# Patient Record
Sex: MALE | Race: WHITE | NOT HISPANIC OR LATINO | Employment: FULL TIME | URBAN - METROPOLITAN AREA
[De-identification: names, ages, dates, MRNs, and addresses within clinical notes are randomized per-mention and may not be internally consistent; named-entity substitution may affect disease eponyms.]

---
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
## 2022-08-01 ENCOUNTER — LAB (OUTPATIENT)
Dept: LAB | Facility: HOSPITAL | Age: 22
End: 2022-08-01

## 2022-08-01 ENCOUNTER — OFFICE VISIT (OUTPATIENT)
Dept: INTERNAL MEDICINE | Facility: CLINIC | Age: 22
End: 2022-08-01

## 2022-08-01 VITALS
SYSTOLIC BLOOD PRESSURE: 112 MMHG | HEIGHT: 74 IN | TEMPERATURE: 97.5 F | HEART RATE: 64 BPM | DIASTOLIC BLOOD PRESSURE: 80 MMHG | BODY MASS INDEX: 21.2 KG/M2 | WEIGHT: 165.2 LBS

## 2022-08-01 DIAGNOSIS — Z11.3 ROUTINE SCREENING FOR STI (SEXUALLY TRANSMITTED INFECTION): ICD-10-CM

## 2022-08-01 DIAGNOSIS — Z11.4 SCREENING FOR HIV (HUMAN IMMUNODEFICIENCY VIRUS): ICD-10-CM

## 2022-08-01 DIAGNOSIS — Z00.00 WELL ADULT EXAM: Primary | ICD-10-CM

## 2022-08-01 DIAGNOSIS — Z00.00 WELL ADULT EXAM: ICD-10-CM

## 2022-08-01 DIAGNOSIS — Z11.59 NEED FOR HEPATITIS C SCREENING TEST: ICD-10-CM

## 2022-08-01 DIAGNOSIS — Z13.220 SCREENING, LIPID: ICD-10-CM

## 2022-08-01 DIAGNOSIS — J30.1 SEASONAL ALLERGIC RHINITIS DUE TO POLLEN: Chronic | ICD-10-CM

## 2022-08-01 LAB
ALBUMIN SERPL-MCNC: 5.1 G/DL (ref 3.5–5.2)
ALBUMIN/GLOB SERPL: 2.6 G/DL
ALP SERPL-CCNC: 55 U/L (ref 39–117)
ALT SERPL W P-5'-P-CCNC: 20 U/L (ref 1–41)
ANION GAP SERPL CALCULATED.3IONS-SCNC: 11.3 MMOL/L (ref 5–15)
AST SERPL-CCNC: 21 U/L (ref 1–40)
BILIRUB SERPL-MCNC: 0.8 MG/DL (ref 0–1.2)
BUN SERPL-MCNC: 14 MG/DL (ref 6–20)
BUN/CREAT SERPL: 13.5 (ref 7–25)
CALCIUM SPEC-SCNC: 9.5 MG/DL (ref 8.6–10.5)
CHLORIDE SERPL-SCNC: 104 MMOL/L (ref 98–107)
CHOLEST SERPL-MCNC: 149 MG/DL (ref 0–200)
CO2 SERPL-SCNC: 26.7 MMOL/L (ref 22–29)
CREAT SERPL-MCNC: 1.04 MG/DL (ref 0.76–1.27)
EGFRCR SERPLBLD CKD-EPI 2021: 102.8 ML/MIN/1.73
GLOBULIN UR ELPH-MCNC: 2 GM/DL
GLUCOSE SERPL-MCNC: 86 MG/DL (ref 65–99)
HCV AB SER DONR QL: NORMAL
HDLC SERPL-MCNC: 64 MG/DL (ref 40–60)
HIV1+2 AB SER QL: NORMAL
LDLC SERPL CALC-MCNC: 75 MG/DL (ref 0–100)
LDLC/HDLC SERPL: 1.19 {RATIO}
POTASSIUM SERPL-SCNC: 4.5 MMOL/L (ref 3.5–5.2)
PROT SERPL-MCNC: 7.1 G/DL (ref 6–8.5)
SODIUM SERPL-SCNC: 142 MMOL/L (ref 136–145)
TRIGL SERPL-MCNC: 43 MG/DL (ref 0–150)
VLDLC SERPL-MCNC: 10 MG/DL (ref 5–40)

## 2022-08-01 PROCEDURE — 87591 N.GONORRHOEAE DNA AMP PROB: CPT

## 2022-08-01 PROCEDURE — 87491 CHLMYD TRACH DNA AMP PROBE: CPT

## 2022-08-01 PROCEDURE — 80061 LIPID PANEL: CPT

## 2022-08-01 PROCEDURE — G0432 EIA HIV-1/HIV-2 SCREEN: HCPCS

## 2022-08-01 PROCEDURE — 80053 COMPREHEN METABOLIC PANEL: CPT

## 2022-08-01 PROCEDURE — 86803 HEPATITIS C AB TEST: CPT

## 2022-08-01 PROCEDURE — 99385 PREV VISIT NEW AGE 18-39: CPT | Performed by: STUDENT IN AN ORGANIZED HEALTH CARE EDUCATION/TRAINING PROGRAM

## 2022-08-01 NOTE — PROGRESS NOTES
"Chief Complaint  Walter Arora is a 22 y.o. male presenting for Annual Exam (Establish care ).     From [place]. 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 Has bachelor and master in finance. Went to [employer] for grad school. Single. No  Children.    Patient has a past medical history of seasonal rhinitis, improved in early 20s.    History of Present Illness  Patient is here to establish care after being under pediatrician's care, last seen about 3 years ago.  Patient has no concerns today.    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  More recently over the last couple of years he has had only minor symptoms, uses Allegra occasionally in the spring/summer.    Patient is sexually active with 2 male partners only, last sexual encounter was 2 weeks ago.  He does not have a girlfriend, but has occasional unprotected sex with same female.  He has never tested for STI or HIV, he is amenable to screening.  He is also fasting today and would like blood work done.    The following portions of the patient's history were reviewed and updated as appropriate: allergies, current medications, past family history, past medical history, past social history, past surgical history and problem list.    Objective  /80 (BP Location: Left arm, Patient Position: Sitting, Cuff Size: Adult)   Pulse 64   Temp 97.5 °F (36.4 °C) (Temporal)   Ht 188 cm (74\")   Wt 74.9 kg (165 lb 3.2 oz)   BMI 21.21 kg/m²     Physical Exam  Vitals reviewed.   Constitutional:       Appearance: Normal appearance.   HENT:      Head: Normocephalic and atraumatic.      Nose: No congestion.   Eyes:      Extraocular Movements: Extraocular movements intact.      Conjunctiva/sclera: Conjunctivae normal.   Cardiovascular:      Rate and Rhythm: Normal rate and regular rhythm.      Heart sounds: Normal heart sounds. No murmur " heard.  Pulmonary:      Effort: Pulmonary effort is normal.      Breath sounds: Normal breath sounds.   Abdominal:      General: There is no distension.      Palpations: Abdomen is soft. There is no mass.      Tenderness: There is no abdominal tenderness.   Musculoskeletal:      Cervical back: Neck supple. No tenderness.      Right lower leg: No edema.      Left lower leg: No edema.   Lymphadenopathy:      Cervical: No cervical adenopathy.   Skin:     General: Skin is warm and dry.   Neurological:      Mental Status: He is alert and oriented to person, place, and time. Mental status is at baseline.   Psychiatric:         Behavior: Behavior normal.         Thought Content: Thought content normal.         Assessment/Plan   1. Well adult exam  Patient in overall good health.  Counseled on recommendations for Tdap/tetanus vaccine every 10 years, he believes he had it within the last 10 years and defers today.  Vaccine offered.  BMI is within normal parameters. No other follow-up for BMI required.  Counseled on recommendations for moderate intensity exercise 30 minutes a day, 5 days a week.  - Comprehensive Metabolic Panel; Future    2. Seasonal allergic rhinitis due to pollen  Improving symptoms, continue as needed use of Allegra    3. Screening for HIV (human immunodeficiency virus)  Patient verbally consents to HIV testing.  I have explained to him that exposure within the last 2 months may not be detected on the HIV test, however patient has had sexual relations with his female in the past so with that the risk would be low.  - HIV-1 / O / 2 Ag / Antibody 4th Generation; Future    4. Screening, lipid  Patient is fasting for lipids today.  - Lipid Panel; Future    5. Need for hepatitis C screening test  We will screen as recommended  - Hepatitis C Antibody; Future    6. Routine screening for STI (sexually transmitted infection)  \- Chlamydia trachomatis, Neisseria gonorrhoeae, PCR - Urine, Urine, Random Void;  Future      Return in about 1 year (around 8/1/2023) for Annual physical.    Future Appointments       Provider Department Center    8/2/2023 8:30 AM Jean Claude Bennett MD [facility] INTERNAL MEDICINE [place]          Jean Claude Bennett MD  Family Medicine  08/01/2022

## 2022-08-02 LAB
C TRACH RRNA SPEC QL NAA+PROBE: NEGATIVE
N GONORRHOEA RRNA SPEC QL NAA+PROBE: NEGATIVE

## 2022-08-11 ENCOUNTER — PATIENT ROUNDING (BHMG ONLY) (OUTPATIENT)
Dept: INTERNAL MEDICINE | Facility: CLINIC | Age: 22
End: 2022-08-11

## 2022-09-05 PROCEDURE — U0004 COV-19 TEST NON-CDC HGH THRU: HCPCS | Performed by: PHYSICIAN ASSISTANT

## 2023-05-22 ENCOUNTER — E-VISIT (OUTPATIENT)
Dept: FAMILY MEDICINE CLINIC | Facility: TELEHEALTH | Age: 23
End: 2023-05-22
Payer: COMMERCIAL

## 2023-05-22 NOTE — EXTERNAL PATIENT INSTRUCTIONS
Diagnosis   Rash (non-specific)   My name is Charity VasquezJAMES vega, and I'm a healthcare provider at [facility]. I've carefully reviewed your responses and photos. Unfortunately, I'm unable to give you an exact diagnosis. However, I'm confident that your rash isn't a serious skin condition.   Medications   Your pharmacy   [facility] [id number] [address] [address] [phone]     Prescription   Triamcinolone acetonide topical ointment (0.5%): Apply thin film on affected area twice a day for up to 14 days. Discontinue sooner if symptoms clear up completely.   About your diagnosis   Because different skin conditions often look the same, it can be difficult to diagnose certain rashes. However, I'm confident your rash isn't serious. If you follow this treatment plan, your symptoms should improve soon.   What to expect   If you follow the treatments recommended here, the rash should clear up in 2 to 4 weeks.   When to seek care   Call us at [phone]   with any sudden or unexpected symptoms.    Your symptoms don't begin to improve after 1 week of treatment.    You develop a fever.    Your symptoms go away but then come back.    Signs of infection, such as fever, red streaks around the rash, or drainage of pus.    Dizziness, confusion, trouble breathing, rapid heartbeat, vomiting, a stiff neck, or loss of muscle coordination.    The rash spreads across your body or moves into sensitive areas, such as eyes, face, lips, or genitals.    Itching that becomes severe or uncontrollable.    Your symptoms begin to affect your quality of life or cause feelings of anxiety or depression.   Other treatment    Wash your skin with a mild, fragrance-free soap and rinse completely. Cool baths with baking soda or oatmeal bath products added to the water can also help. Gently pat yourself dry. Take care not to accidentally rub or scrape your rash.    Trim your fingernails and try to avoid scratching.  Scratching can lead to a skin infection and scarring.    Wear loose cotton clothing to avoid further irritating your skin.   Prevention    Practice gentle skin care. Use a mild, fragrance-free soap and lukewarm water when washing your face. Avoid products and cosmetics that irritate your skin. Use a moisturizing lotion and sunscreen with an SPF of 30 or higher on a regular basis.    Know your triggers and avoid them.    Wash your gym clothes, shower shoes, and towels thoroughly after every workout. Wash the rest of your clothes, linens, and towels often.    Work and sleep in a cool room. Try to manage stress. Exercise regularly and set aside time to relax.    A humidifier can help keep the air from becoming too dry and making your rash worse.   Your provider   Your diagnosis was provided by JAMES Alfredo, a member of your trusted care team at [facility].   If you have any questions, call us at [phone]  .

## 2023-05-22 NOTE — E-VISIT TREATED
Chief Complaint: Rashes and other skin conditions   Patient introduction   Patient is 23-year-old male presenting with pruritic, nonpainful rash on their buttocks for longer than 2 weeks.   Regarding possible triggers/exposures, patient writes: Likely lichen simplex.   General presentation   Patient has not had any recent cold symptoms. No fever, chills, myalgia, nausea, vomiting, diarrhea, headache, or fatigue/lethargy.   The following treatments were not helpful for current rash symptoms:    An allergy medication such as fexofenadine, loratadine, or cetirizine    Calamine lotion    Moisturizing lotion    Non-prescription antifungal cream    Non-prescription steroid cream   Patient has no previous history of a similar rash.   Insect bites: No known recent insect exposure.   Chickenpox: Patient is uncertain whether they've had chickenpox. Has received 2 doses of varicella vaccine.   Scabies: No recent scabies exposure.   Impetigo: No recent impetigo exposure.   Ticks: Patient has not recently spent significant time outdoors. In previous month, patient has not spent any time in regions with a high risk of tick-borne disease.   Appearance or location of rash does not change throughout the course of a day. Pruritus described as severe and is unchanged over time. Itching makes daily activities difficult. Itching does not affect sleep.   Rash has not spread to a new location.   No herald patch prior to onset of rash.   Rash is not in an area that is regularly shaved. Patient does not participate in contact sports. Patient does not work in a school or childcare facility.   No history of prior MRSA infection.   Symptoms are aggravated by perspiration.   Review of red flags/alarm symptoms:    No systemic symptoms    No symptoms of anaphylactic reaction    No swollen lymph nodes    No recent history suggesting adverse drug rash (no new medication, herb, or supplement)   Current medications   Currently taking Allegra-JULIAN.    Medication allergies   None.   Medication contraindication review   Not currently taking ACE inhibitors or ARBs.   No cerebral malaria, CHF, cutaneous sacdx-xuyfon-mlvw disease, folate deficiency, G6PD deficiency (or breastfeeding a child with G6PD deficiency), generalized erythroderma, liver disease, lamellar ichthyosis, malignancy or premalignancy at the affected site, megaloblastic anemia, mononucleosis, Netherton syndrome, peripheral neuropathy, porphyria, QT prolongation, congenital long QT syndrome, skin barrier defect condition, systemic mycoses, TMP/SMX-associated thrombocytopenia, thyroid dysfunction, or ulcerative colitis.   Patient has history of history of aortic aneurysm or dissection. Therefore, ciprofloxacin will not be prescribed.   Past medical history   Immune conditions: No immunocompromising conditions.   No history of cancer.   Patient-submitted comments   Patient was asked if they had anything to add about their symptoms. Patient writes: Jock itch in affected area has cleared up. Itchy scar tissue remains..   Patient did not request an excuse note.   Assessment   Rash (non-specific).   Based on the patient's symptoms and submitted photos, the exact etiology of their rash is unclear. Recommend symptomatic care with close follow-up if symptoms persist, worsen, or change.   Plan   Medications:    triamcinolone acetonide 0.5 % topical ointment RX 0.5% apply thin film on affected area bid 14d Use for up to 14 days. Discontinue sooner if symptoms clear up completely. Amount is 15 g.   The patient's prescription will be sent to:   [facility] [id number]   [address] [place] [address]   Phone: [phone]     Fax: [phone]   Education:    Condition and causes    Prevention    Treatment and self-care    When to call provider   ----------   Electronically signed by JAMES Alfredo on 2023-05-22 at 14:45PM   ----------   Patient Interview Transcript:   Where is the affected area  located? Select all that apply.    Buttocks   Not selected:    Scalp    Face    Inside mouth or on lips    Neck    Arm    Hand    Chest    Stomach    Back    Groin    Leg    Foot or in between toes    None of the above   Which buttock is bothering you? Select one.    Both   Not selected:    Right    Left   Before your skin symptoms appeared, were you exposed to any of these possible triggers? Select all that apply.    Other (specify): Likely lichen simplex   Not selected:    Tick bite    Other insect bite or sting in the affected area    Cut, scrape, or other skin injury in the affected area    Contact with poison oak, poison ivy, or poison sumac in the affected area    Contact with a new soap, perfume, skincare product, cleaning product, or other chemical in the affected area    Wearing new jewelry, belt buckle, or other metal accessory in the affected area    Taking a new medication, supplement, or herb    Consuming a new food or drink    Vaccine injection    Exposure to extreme hot or cold temperatures    Exercising intensely    Sitting in a hot tub or swimming in a heated swimming pool    Wearing ill-fitting shoes or socks for a long time    Contact with someone who has a similar rash    Contact with someone who has impetigo    Contact with someone who has scabies    None of the above   Have you ever had chickenpox? Select one.    I'm not sure   Not selected:    Yes    No   Have you ever been vaccinated against chickenpox? Select one.    Yes, 2 doses of the vaccine   Not selected:    Yes, 1 dose of the vaccine    No    I'm not sure   Is the affected skin in an area that you shave regularly? Select one.    No   Not selected:    Yes   Does the appearance or location of your skin symptoms change throughout the course of a day? For example, does it appear on one part of your body in the morning, disappear completely after several hours, and then reappear on a different part of your body? Select one.    No   Not  selected:    Yes   Since your skin symptoms first appeared, have they spread or shown up in new locations? This includes covering a larger area than they first did, or spreading to another part of the body. Select one.    No   Not selected:    Yes   Which of these describe the affected area? Select all that apply.    Itchy   Not selected:    Painful    Warmer than other areas of my skin    None of the above   How intense is the itching? Select one.    Severe   Not selected:    Mild    Moderate    Unbearable   Has the itching gotten better, worse, or stayed the same? Select one.    Same   Not selected:    Better    Worse   Has the itching made daily activities difficult? Select one.    Yes   Not selected:    No   Has the itching made it difficult to sleep? Select one.    No   Not selected:    Yes   How long have you had these current skin symptoms? Select one.    More than 2 weeks   Not selected:    Less than 24 hours    24 to 48 hours    2 to 3 days    3 to 5 days    5 to 7 days    1 to 2 weeks   Do any of these make your symptoms worse? Select all that apply.    Sweat   Not selected:    Alcohol    Exercise    Exposure to very hot or very cold temperature    Certain foods    Changes in weather    , soaps, or detergents    Hot beverages    Spicy foods    Stress or strong emotions (such as anger or embarrassment)    Sun exposure    Wool in clothing or blankets    None of the above   To recommend the best treatment for you, we need to see photos of the affected area.   [image: [email or web address]]   Close-up detail (for size, shape, and color)   [image: [email or web address]]   Surrounding area (to compare with healthy skin)   [image: [email or web address]]   Affected area at a distance (for scale and location)   If you choose not to send photos, you'll need to speak with a provider to get care.    Select one.    OK, I'll send photos.   Not selected:    I'd rather not  send photos. Show me my care options.   Send at least 3 photos for review - Don't use a flash. - Make sure the photos are in focus. - Take a close-up photo (for size, shape, color). - Take a photo showing surrounding area (to compare with healthy skin). - Take a photo with a quarter coin or ruler near the affected area to show scale. - If more than one location is affected, repeat for each location.    Upload 1    Upload 2    Upload 3    Upload 4   Not selected:    Upload 5   A rash can be a sign of a more serious condition. These conditions may need in-person care for an exam or lab tests. Along with your skin symptoms, have you had any of these symptoms? Select all that apply.    None of these   Not selected:    Fever    Chills    Body aches or muscle aches    Nausea    Vomiting    Diarrhea    Headache    Fatigue, exhaustion, or lethargy (feeling drowsy, dull, or low energy)   Have you had swollen lymph nodes? Swollen lymph nodes are small lumps under the skin that are soft, tender, and often painful. They may be noticed in the neck, the armpits, or the groin. Select one.    No, not that I've noticed   Not selected:    Yes   Along with your skin symptoms, have you had any of these symptoms? Select all that apply.    None of these   Not selected:    Chest pains or rapid heartbeat    Shortness of breath or wheezing    Swelling of lips or tongue    Throat tightness or hoarse voice    Stomach cramps, diarrhea, or vomiting    Confusion or dizziness   Have you recently had any cold symptoms (runny nose, nasal congestion, cough, or sore throat)? Select one.    No   Not selected:    Yes   Before the rash appeared, did you see a large, round patch on your chest, stomach, or back? This patch is usually 1 to 4 inches across, dry, and itchy. It often appears a few days to a few weeks before the rash. Select one.    No   Not selected:    Yes   Were you recently exposed to any insects? For example: - Do you have any household  pets that may have fleas? - Have you noticed an increase in spiders, either indoors or outdoors? - Have you slept where bedbugs may be present? - Have you hiked, camped, or gardened where mosquitoes may have been present?    No, not that I know of   Not selected:    Yes   In the last month, did you spend a lot of time outdoors, especially in wooded areas with brushy fields or tall grasses? Select one.    No   Not selected:    Yes   In the last month, have you been to any of these states? Scroll to see all options. Select all that apply.    No   Not selected:    [facility]   Have you had these skin symptoms before? Select one.    No   Not selected:    At least once before    Many times before    I'm not sure   Do you or does anyone in your family have a history of allergies (hay fever, seasonal allergies), eczema, or asthma? Select all that apply.    No, not that I know of   Not selected:    Yes, I do    Yes, a family member does   Have you ever been treated for a MRSA (methicillin-resistant Staphylococcus aureus) infection? MRSA is a type of bacteria that's resistant to many commonly used antibiotics. Select one.    No, not that I know of   Not selected:    Yes   Do you have any of these conditions? Scroll to see all options. Select all that apply.    None of the above   Not selected:    Cerebral malaria    Congenital long QT syndrome    Folate deficiency    Systemic fungal infection, such as invasive candidiasis    G6PD deficiency, or breastfeeding a child with G6PD deficiency    Infection at the affected area    Megaloblastic anemia    Mono (mononucleosis)    Decreased sensation in feet (peripheral neuropathy)    Porphyria    Skin cancer or pre-malignancy at the affected area    A serious skin condition  (skin barrier defect condition, Netherton syndrome, lamellar ichthyosis, generalized erythroderma, or cutaneous yqbqa-uybqol-xurb disease)    QT prolongation    Thyroid dysfunction    Ulcerative colitis   Do you have any of these conditions that can affect the immune system? Scroll to see all options. Select all that apply.    None of these   Not selected:    History of bone marrow transplant    Chronic kidney disease    Chronic liver disease (including cirrhosis)    HIV/AIDS    Inflammatory bowel disease (Crohn's disease or ulcerative colitis)    Lupus    Moderate to severe plaque psoriasis    Multiple sclerosis    Rheumatoid arthritis    Sickle cell anemia    Alpha or beta thalassemia    History of solid organ transplant (kidney, liver, or heart)    History of spleen removal    An autoimmune disorder not listed here (specify)    A condition requiring treatment with long-term use of oral steroids (such as prednisone, prednisolone, or dexamethasone) (specify)   Have you ever been diagnosed with cancer? Select one.    No   Not selected:    Yes, I have cancer now    Yes, but I'm in remission   Do you have any of these conditions? Scroll to see all options. Select all that apply.    History of aortic aneurysm or dissection   Not selected:    Myasthenia gravis    Marfan syndrome    Karoline-Danlos syndrome    None of the above   Are you currently being treated for type 1 or type 2 diabetes? Select one.    No   Not selected:    Yes   Do you play sports or take part in activities with skin-to-skin contact? Select one.    No   Not selected:    Yes   Do you work in a school or childcare center? Select one.    No   Not selected:    Yes   Have you tried any treatments for your current symptoms? Select one.    Yes   Not selected:    No   An allergy pill such as fexofenadine (Allegra), loratadine (Claritin), or cetirizine (Zyrtec)    Not helpful   Not selected:    Helpful   Calamine lotion    Not helpful   Not selected:     Helpful   A lotion or cream for dry skin    Not helpful   Not selected:    Helpful   A non-prescription antifungal cream    Not helpful   Not selected:    Helpful   A non-prescription steroid cream (such as hydrocortisone)    Not helpful   Not selected:    Helpful   Are you taking any of these medications? Select all that apply.    No   Not selected:    An ACE inhibitor, such as lisinopril, enalapril, captopril, or benazepril    An angiotensin II receptor blocker (ARB), such as candesartan, irbesartan, losartan, or valsartan    Kynmobi or Apokyn (apomorphine)   Are you taking any other medications, vitamins, or supplements? Select one.    Yes   Not selected:    No   Have you ever had an allergic or bad reaction to any medication? Select one.    No   Not selected:    Yes   Do you need a doctor's note? A doctor's note confirms that you received care today and states when you can return to school or work. It does not contain information about your diagnosis or treatment plan. Your provider will make the final decision on whether to give you a doctor's note. Doctor's notes cannot be backdated. Select one.    No   Not selected:    Today only (1 day)    Today and tomorrow (2 days)    3 days   Is there anything you'd like to add about your symptoms? Please limit your comments to the symptoms asked about in this interview. If you include comments about other concerns, your provider may recommend that you be seen in person.    Jock itch in affected area has cleared up. Itchy scar tissue remains.   ----------   Medical history   Medical history data does not currently exist for this patient.

## 2023-05-29 ENCOUNTER — E-VISIT (OUTPATIENT)
Dept: FAMILY MEDICINE CLINIC | Facility: TELEHEALTH | Age: 23
End: 2023-05-29

## 2023-05-29 NOTE — EXTERNAL PATIENT INSTRUCTIONS
Note   A Behavioral Health Referral has been ordered for you. Someone will be in contact with you to set up an appointment. Since Behavior health needs to be tailored to each person's individual needs and sometimes requires a combination of therapy and medication. These therapies need to be monitored under the guidance of a Behavioral Health provider. Please contact us, if you do not hear from someone within a few days. If you need assistance before you are contacted, please go to the appropriate source, your Primary care provider, Urgent Care or the Emergency Department.   Diagnosis   Anxiety and depression   My name is JAMES Taylor. I'm a healthcare provider at [facility]. I've reviewed your interview, and I see that you have some common symptoms of anxiety and depression. I'm glad you reached out.    Anxiety and depression are two of the most common mental health conditions worldwide. In the [place], about 1 in 5 people will experience clinical depression in their lifetime. Fortunately, effective treatments are available. The sooner you start treatment, the better it works.    Treatment for anxiety and depression can include counseling, coaching, consultations, medication, or various digital tools. In creating your treatment plan, I've considered your symptoms, current situation, medical history, and previous treatments, if any.    Please follow up with your provider in a few weeks. They'll check how you're doing and adjust your treatment plan if necessary.    In addition to your prescribed treatment, there are things you can do to help yourself feel better. Anxiety and depression can lead you to avoid tasks, activities, and being with others. Taking action can help break the cycle of avoidance. Even small actions and lifestyle changes can make a big difference. Try some of the suggestions in the Other treatment section below.   Orders and referrals   I've included a referral for therapy in  your treatment plan. Someone will contact you to schedule an appointment for counseling or therapy.   About your diagnosis   Feeling anxious or nervous every once in a while is normal. It becomes a health problem when you're very anxious, worried, restless, or irritable on most days for 6 months or longer.   Depression is different from ordinary sadness. When you're sad or going through normal grief, the feelings may come and go, and then fade over time. Depression causes long-standing symptoms that affect your ability to go about your daily life.   Anxiety and depression often occur at the same time. In fact, many symptoms overlap between the two conditions. Sometimes they can also cause physical symptoms such as headaches and stomach pains.   Common symptoms of anxiety and depression include:    Feeling sad, hopeless, discouraged, or down    Loss of interest or pleasure in previously enjoyable activities    Appetite or weight changes    Sleep disturbances: sleeping too much or too little    Either restlessness or sluggishness    Loss of energy    Excessive guilt    Feelings of worthlessness    Difficulty concentrating    Recurrent thoughts of death or suicide   Fortunately, effective treatments for anxiety and depression are available. The medications that treat anxiety will often work for depression, and vice versa.   What to expect   Counseling and talk therapy   Counseling or therapy teaches you new coping skills and more adaptive ways of thinking about problems. These tools can help you make positive changes. The benefits of counseling often last long after treatment sessions have stopped.   Many people with mild symptoms of anxiety and depression don't need medication, and can be treated with counseling, coaching, or other types of care management.   When to seek care   If you feel like harming yourself or others, call 911 right away.   The [facility] Suicide and Crisis Lifeline is also available. You can call 436    to speak with a counselor at the lifeline, or you can connect with one using their online chat  .   Call us at [phone]   with any sudden or unexpected symptoms.    Worsening depression symptoms    Worsening anxiety symptoms    Feeling extremely agitated or restless    Panic attacks    Worsening insomnia    New or worsening irritability    Inappropriate aggression, anger, or violence    Dangerous impulses    Extreme increase in activity or talking    Other unusual changes in behavior, mood, thoughts, or feeling   Other treatment   The tips below may help you feel better while you start your treatment plan:   Self-care    Anxiety and depression can make self-care hard, but taking action can help you get better. So start where you are and set small goals. These can be simple: get out of bed, take a shower, get dressed, prepare a meal.    Make a list of activities that usually improve your mood. When you're feeling down, try doing one of those activities, even for a few minutes.    Be kind to yourself. Don't get down on yourself if you don't reach a goal. Be willing to try again.    Try to eat on a regular schedule. Blood sugar levels can affect mood.   Avoid alcohol - Alcohol is a depressant and may make you feel worse.   Exercise    Physical exercise has an especially positive effect on anxiety and depression. If you're able to, try walking 30 minutes a day, 3 to 5 times a week. If that sounds like too much, challenge yourself to start walking for just 10 minutes a day. If walking is not for you, find another activity. Any kind of physical activity helps. The best exercise is the kind you enjoy and will actually do.   Improve your sleep   Getting better sleep is one of the best things you can do to improve your symptoms.    Caffeine, tobacco, and alcohol can cause interrupted sleep. Cutting down or quitting these can improve the quality of your sleep. If you can't quit caffeine completely, try avoiding it  "later in the day.    Set a regular bedtime, and allow a period of time to \"unwind\" before going to sleep.    Wake up at the same time every day.    Turn off or put away all electronic devices an hour before going to sleep.    Avoid reading, watching TV, or using electronic devices in bed.    As much as possible, keep your bedroom dark, cool, and quiet.    If you're struggling to sleep, don't stay in bed. Get up and go to a quiet spot. Read or do relaxation exercises. Then go back to bed and try again.   Try mindfulness exercises    If your mind races, focus on your body instead. Breathe in slowly through your nose and out through your mouth.    Some people find that meditation helps with mood symptoms. If you want to try meditation but don't know how, mobile apps can get you started.   Use your creativity    When you have anxiety and depression, you can often spend too much time thinking. Making something with your hands can use your thoughts in a positive way and bring some relief. It also helps you move from inaction to action. Activities like writing in a journal, gardening, woodworking, cooking, or doing a craft can help focus your mind.   Connect with others    If you can't meet in person, send a short text or email to someone just to keep in touch.    If you use social media, notice how it makes you feel. If certain topics or people have a negative effect on your mood, unfollow them. Limit the time you spend on social media. Active participation can be better than passive scrolling through a feed.    If you're up to it, try volunteering. Or just do something kind for someone. This can lift your mood as well as theirs.   Your provider   Your diagnosis was provided by JAMES Taylor, a member of your trusted care team at [facility].   If you have any questions, call us at [phone]  .    "

## 2023-05-29 NOTE — E-VISIT TREATED
Chief Complaint: Anxiety, Depression, Stress   Patient introduction   Patient is 23-year-old male presenting with mood symptoms. Patient has had current symptoms for more than a year. Patient is willing to try medication as part of their treatment plan.   Patient-submitted comments explaining reason for visit: *Have been much more restless and anxious that usual over the past few months. 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   Recent relationship stress has compounded with work pressure and has affected my mental well-being*.   Patient did not request an excuse note.   Has had recent unusual stress relating to personal relationships and work.   Depression screening   PHQ-9. Response options are: Not at all (0), On several days (1), More than half the days (2), or Nearly every day (3).   Over the past 2 weeks, patient has been bothered:    (1) On several days by having little interest or pleasure in doing things    (2) On more than half the days by depressed mood    (1) On several days by sleep disturbance    (0) Not at all by fatigue or lethargy    (0) Not at all by change in appetite    (1) On several days by feelings of worthlessness or excessive guilt    (1) On several days by poor concentration    (1) On several days by observable restlessness or slowness in movement    (1) On several days by thoughts of hurting themselves or that they would be better off dead   The above problems have made it somewhat difficult to work, function at home, or get along with other people.   Score: 8. Interpretation: 0 to 4: None to minimal. 5 to 9: Mild depression. 10 to 14: Major Depressive Disorder, Mild. 15 to 19: Major Depressive Disorder, Moderately Severe. 20 to 27: Major Depressive Disorder, Severe.   Anxiety screening   MILANA-7. Response options are: Not at all (0), On several days (1), More than half the days (2), or Nearly every day (3)   Over the past 2 weeks,  patient has been bothered:    (3) Nearly every day by feeling nervous, anxious, or on edge    (3) Nearly every day by not being able to stop or control worrying    (3) Nearly every day by worrying too much about different things    (2) On more than half the days by having trouble relaxing    (1) On several days by being so restless that it is hard to sit still    (1) On several days by becoming easily annoyed or irritable    (2) On more than half the days by feeling afraid, as if something awful might happen   The above problems have made it somewhat difficult to work, function at home, or get along with other people.   Score: 15. Interpretation: 0 to 4: None to minimal. 5 to 9: Mild anxiety. 10 to 14: Moderate anxiety. 15 to 21: Severe anxiety.   Suicide risk screening   Score: Negative screen (based on PHQ-9 responses above).   Action taken based on risk:    Negative screen: Patient completed interview.    Low risk: Patient completed interview. Follow-up per provider discretion.    Moderate risk: Recommended to call [phone] or [phone] or to go to their nearest ER. Patient given option to continue with the interview if those options are not relevant at this time. Follow-up per provider discretion.    High risk: Interview terminated. Recommended to go to ER or to call [phone] or [phone].   Repetitive thoughts and behaviors screening   DSM-5 Level 1 Cross-Cutting Symptom Measure, Section X. 2 items. Response options are: Not at all (0), Rarely (1), Several days (2), More than half the days (3), or Nearly every day (4)   Over the past 2 weeks, patient has been bothered:    (4) Nearly every day by unpleasant thoughts, urges, or images that repeatedly enter their mind    (4) Nearly every day by feeling driven to repeat certain behaviors or mental acts   Score: 8. Interpretation: 0 to 2 (with 0 to 1 on both items): Negative screen. 2 or higher (with 2 or higher on either item): Positive screen.   Cathleen/hypomania screening   DSM-5 Level  1 Cross-Cutting Symptom Measure, Section III. 2 items. Response options are: Not at all (0), Rarely (1), Several days (2), More than half the days (3), or Nearly every day (4)   Over the past 2 weeks, patient has been bothered:    (4) Nearly every day by sleeping less than usual, but still having a lot of energy    (2) On several days by starting lots more projects than usual or doing more risky things than usual   Score: 6. Interpretation: 0 to 2 (with 1 on both items): Negative screen. 2 or higher (with 2 or higher on at least 1 item): Positive screen; in-interview follow-up with Nena Self-Rating Cathleen (ASRM) Scale.   Nena Self-Rating Cathleen (ASRM) Scale. 5 items in which patient chooses the statement that best describes how they have been feeling over the past week.   Patient responses:    (1) I occasionally feel happier or more cheerful than usual    (1) I occasionally feel more self-confident than usual    (3) I need less sleep than usual most of the time    (1) I occasionally talk more than usual    (4) I am constantly active or on the go all the time   Score: 10. Interpretation: A score of 6 or higher indicates a high probability of a manic or hypomanic condition and may indicate a need for treatment and/or further diagnostic workup. A score of 5 or lower is less likely to be associated with significant symptoms of cathleen.   Psychosis/hallucination screening   DSM-5 Level 1 Cross-Cutting Symptom Measure, Section VII. 2 items. Response options are: Not at all (0), Rarely (1), Several days (2), More than half the days (3), or Nearly every day (4)   Over the past 2 weeks, patient has been bothered:    (0) Not at all by hearing things other people could not hear    (0) Not at all by feeling that someone could hear their thoughts   Score: 0. Interpretation: 0: Negative screen. 1 or higher: Positive screen.   Substance abuse screening   DSM-5 Level 1 Cross-Cutting Symptom Measure, Section XIII. 2 items on use of  tobacco, recreational drugs, or prescription medications beyond the amount prescribed or duration of prescription.   Over the past 2 weeks, patient:    (0) Did not use tobacco    (4) Used a recreational or prescription drug on their own nearly every day   Score: 4. Interpretation: 0 is a negative screen. 1 or higher with positive response for prescription/recreational drug abuse leads to follow-up with Level 2 Cross-Cutting Symptom Measure, Section XIII. 1 or higher with positive response for tobacco use leads to tobacco cessation advice in AVS.   DSM-5 Level 2 Cross-Cutting Symptom Measure, Section XIII. 1 item per positive response to substance use on Level 1 screening above.   Over the past 2 weeks, patient:    (4) Used marijuana nearly every day   Score: 4. Interpretation: Scores on the individual items should be interpreted independently. Positive responses on multiple items indicates greater severity and complexity of substance use.   AUDIT-C. 3 items, shown if alcohol use reported above.   During the past year, patient:    (2) Had an alcoholic drink 2 to 4 times per month    (1) Had 3 to 4 alcoholic drinks on a typical day when they were drinking    (1) Had 6 or more drinks on one occasion less than monthly   Score: 4. Interpretation: A score of 4 or higher in men is a positive screen for unhealthy drinking.   Comorbid/Exacerbating conditions   No history of asthma, cancer, chronic pain, congestive heart failure, coronary artery disease, diabetes, epilepsy, hypertension, inflammatory arthritis, kidney disease or history of kindey function problems, lupus, multiple sclerosis, Parkinson disease, thyroid disorder, or viral hepatitis.   Past mental health history   No history of depression, generalized anxiety disorder, panic attacks, PTSD, OCD, bipolar disorder, or schizophrenia or schizoaffective disorder.   Family history of mental health disorders   No known family history of depression, generalized anxiety  disorder, panic attacks, PTSD, OCD, bipolar disorder, schizophrenia/schizoaffective disorder, substance use disorder, or suicide/suicide attempt.   Current mental health treatment   Patient is not currently taking medication for any mental health condition. Patient is not currently in counseling or therapy. Patient is not currently being seen by a psychiatrist and has not been seen by one in the last 2 years.   Previous mental health treatment   No history of diagnosis with any mental health condition.   Patient has not taken medication for any mental health condition in the past.   Current medications   Currently taking Triamcinolone.   Medication allergies   None.   Medication contraindications   None.   Assessment   Mixed anxiety and depression.   This diagnosis is based on review of patient interview responses and other available clinical information.    PHQ-9 depression screening score: 8. Interpretation: 5 to 9: Mild depression.    MILANA-7 generalized anxiety screening score: 15. Interpretation: 15 to 21: Severe anxiety.   Suicide risk severity screening was negative.   Based on screening tests, the following areas warrant further evaluation at follow-up:    Alcohol use    Substance use    Repetitive thoughts and behaviors    Cathleen/hypomania   Plan   Medications:   No medications prescribed.   Orders:    Referral to behavioral health. Additional note: Urgent Referral to [facility] for [facility] for medication management   Education:    Condition and causes    Treatment and self-care    Possible medication side effects    When to call provider   ----------   Electronically signed by JAMES Taylor on 2023-05-29 at 11:23AM   ----------   Patient Interview Transcript:   Have you ever been diagnosed with any of these mental health conditions? Select all that apply.    None of the above   Not selected:    Depression    Generalized anxiety disorder (MILANA)    Panic attacks    Post traumatic  stress disorder (PTSD)    Obsessive-compulsive disorder (OCD)    Bipolar disorder    Schizophrenia or schizoaffective disorder    A mental health condition not listed here (specify)   Are you currently taking medication for any mental health condition? Select one.    No   Not selected:    Yes   Have you taken medication for any mental health condition in the past? Select one.    No   Not selected:    Yes   Have you recently experienced unusual stress from any of these? Select all that apply.    Personal relationships    Work   Not selected:    Home situation    Family    Finances    Something related to COVID-19    Current news and events    None of the above   Are you currently in counseling or therapy? Select one.    No   Not selected:    Yes   Are you currently being seen by a psychiatrist, or have you been seen by a psychiatrist in the last 2 years? Select one.    No   Not selected:    Yes, currently    Yes, within the last 2 years   Do you have any of these medical conditions? Scroll to see all options. Select all that apply.    None of the above   Not selected:    Asthma    Cancer    Chronic pain    Congestive heart failure    Coronary artery disease (blocked arteries in the heart)    Diabetes    Epilepsy    High blood pressure    Inflammatory arthritis    Kidney disease or history of kidney function problems    Lupus (SLE)    Multiple sclerosis    Parkinson disease    Thyroid disorder    Viral hepatitis   Do any of these apply to your first-degree blood relatives? First-degree blood relatives include parents, siblings, and children who you're related to by birth, not by marriage or adoption. Select all that apply.    No, not that I know of   Not selected:    Depression    Generalized anxiety disorder (MILANA)    Panic attacks    Post traumatic stress disorder (PTSD)    Obsessive-compulsive disorder (OCD)    Bipolar disorder    Schizophrenia or schizoaffective disorder    Drug or alcohol addiction (substance use  disorder)     by suicide    Attempted suicide   1. Over the past 2 weeks, how often have you been bothered by: Having little interest or pleasure in doing things Select one.    Several days   Not selected:    Not at all    More than half the days    Nearly every day   2. Over the past 2 weeks, how often have you been bothered by: Feeling down, depressed, or hopeless Select one.    More than half the days   Not selected:    Not at all    Several days    Nearly every day   3. Over the past 2 weeks, how often have you been bothered by: Trouble falling or staying asleep, or sleeping too much Select one.    Several days   Not selected:    Not at all    More than half the days    Nearly every day   4. Over the past 2 weeks, how often have you been bothered by: Feeling tired or having little energy Select one.    Not at all   Not selected:    Several days    More than half the days    Nearly every day   5. Over the past 2 weeks, how often have you been bothered by: Poor appetite or overeating Select one.    Not at all   Not selected:    Several days    More than half the days    Nearly every day   6. Over the past 2 weeks, how often have you been bothered by: Feeling bad about yourself, that you're a failure, or that you've let yourself or friends and family down Select one.    Several days   Not selected:    Not at all    More than half the days    Nearly every day   7. Over the past 2 weeks, how often have you been bothered by: Trouble concentrating on things like watching TV or reading the news Select one.    Several days   Not selected:    Not at all    More than half the days    Nearly every day   8. Over the past 2 weeks, how often have you been bothered by: Moving or speaking so slowly that other people could have noticed OR Being so fidgety or restless that you have been moving around a lot more than usual Select one.    Several days   Not selected:    Not at all    More than half the days    Nearly every day    9. Over the past 2 weeks, how often have you been bothered by: Thoughts that you'd be better off dead or thoughts of hurting yourself Select one.    Several days   Not selected:    Not at all    More than half the days    Nearly every day   How difficult have these problems made it for you to work, take care of things at home, or get along with other people? Select one.    Somewhat difficult   Not selected:    Not difficult at all    Very difficult    Extremely difficult   1. Over the past 2 weeks, how often have you been bothered by: Feeling nervous, anxious, or on edge? Select one.    Nearly every day   Not selected:    Not at all    Several days    More than half the days   2. Over the past 2 weeks, how often have you been bothered by: Not being able to stop or control worrying? Select one.    Nearly every day   Not selected:    Not at all    Several days    More than half the days   3. Over the past 2 weeks, how often have you been bothered by: Worrying too much about different things? Select one.    Nearly every day   Not selected:    Not at all    Several days    More than half the days   4. Over the past 2 weeks, how often have you been bothered by: Having trouble relaxing? Select one.    More than half the days   Not selected:    Not at all    Several days    Nearly every day   5. Over the past 2 weeks, how often have you been bothered by: Being so restless that it's hard to sit still? Select one.    Several days   Not selected:    Not at all    More than half the days    Nearly every day   6. Over the past 2 weeks, how often have you been bothered by: Becoming easily annoyed or irritable? Select one.    Several days   Not selected:    Not at all    More than half the days    Nearly every day   7. Over the past 2 weeks, how often have you been bothered by: Feeling afraid, as if something awful might happen? Select one.    More than half the days   Not selected:    Not at all    Several days    Nearly every  "day   How difficult have these symptoms made it for you to do your work, take care of things at home, or get along with other people? Select one.    Somewhat difficult   Not selected:    Not difficult at all    Very difficult    Extremely difficult   Over the past 2 weeks, how often have you been bothered by: Sleeping less than usual, but still having a lot of energy? Select one.    Nearly every day   Not selected:    Not at all    1 to 2 days    Several days    More than half the days   Over the past 2 weeks, how often have you been bothered by: Starting lots more projects than usual or doing more risky things than usual? Select one.    Several days   Not selected:    Not at all    1 to 2 days    More than half the days    Nearly every day   Which statement best describes how you've been feeling over the past week? \"Occasionally\" here means once or twice, and \"often\" means several times or more. Select one.    I occasionally feel happier or more cheerful than usual   Not selected:    I don't feel happier or more cheerful than usual    I often feel happier or more cheerful than usual    I feel happier or more cheerful than usual most of the time    I feel happier or more cheerful than usual all of the time   Which statement best describes how you've been feeling over the past week? \"Occasionally\" here means once or twice, and \"often\" means several times or more. Select one.    I occasionally feel more self-confident than usual   Not selected:    I don't feel more self-confident than usual    I often feel more self-confident than usual    I feel more self-confident than usual most of the time    I feel extremely self-confident all of the time   Which statement best describes how you've been feeling over the past week? \"Occasionally\" here means once or twice, and \"often\" means several times or more. Select one.    I need less sleep than usual most of the time   Not selected:    I don't need less sleep than usual    I " "occasionally need less sleep than usual    I often need less sleep than usual    I can go all day and all night without any sleep and still not feel tired   Which statement best describes how you've been feeling over the past week? \"Occasionally\" here means once or twice, and \"often\" means several times or more. Select one.    I occasionally talk more than usual   Not selected:    I don't talk more than usual    I often talk more than usual    I talk more than usual most of the time    I talk constantly and can't be interrupted   Which statement best describes how you've been feeling over the past week? \"Occasionally\" here means once or twice, and \"often\" means several times or more. By \"active,\" we mean socially, sexually, or at work, home, or school. Select one.    I am constantly active or on the go all the time   Not selected:    I haven't been more active than usual    I have occasionally been more active than usual    I have often been more active than usual    I have been more active than usual most of the time   Over the past 2 weeks, how often have you been bothered by: Hearing things other people couldn't hear, such as voices even when no one was around? Select one.    Not at all   Not selected:    1 to 2 days    Several days    More than half the days    Nearly every day   Over the past 2 weeks, how often have you been bothered by: Feeling that someone could hear your thoughts, or that you could hear what another person was thinking? Select one.    Not at all   Not selected:    1 to 2 days    Several days    More than half the days    Nearly every day   Over the past 2 weeks, how often have you been bothered by: Unpleasant thoughts, urges, or images that repeatedly enter your mind? Select one.    Nearly every day   Not selected:    Not at all    1 to 2 days    Several days    More than half the days   Over the past 2 weeks, how often have you been bothered by: Feeling driven to perform certain behaviors " "or mental acts over and over again? Select one.    Nearly every day   Not selected:    Not at all    1 to 2 days    Several days    More than half the days   In the past year, how often did you have a drink containing alcohol? Select one.    2 to 4 times per month   Not selected:    Never    Monthly or less    2 to 3 times per week    4 or more times per week   In the past year, how many drinks did you have on a typical day when you were drinking? Select one.    3 or 4   Not selected:    1 or 2    5 or 6    7 to 9    10 or more   In the past year, how often did you have 6 or more drinks on one occasion? Select one.    Less than monthly   Not selected:    Never    Monthly    Weekly    Daily or almost daily   Over the past 2 weeks, how often have you: Smoked any cigarettes, smoked a cigar or pipe, or used snuff or chewing tobacco? Select one.    Not at all   Not selected:    1 to 2 days    Several days    More than half the days    Nearly every day   Over the past 2 weeks, how often did you use any of these medicines on your own? \"On your own\" means without a doctor's prescription, or more than prescribed, or longer than prescribed. - Prescription painkillers, such as Vicodin - Stimulants, such as Ritalin or Adderall - Sedatives or tranquilizers, such as sleeping pills or Valium - Marijuana - Cocaine or crack - Club drugs, such as Ecstasy - Hallucinogens, such as LSD - Heroin - Inhalants or solvents, such as glue - Methamphetamines, such as speed Select one.    Nearly every day   Not selected:    Not at all    1 to 2 days    Several days    More than half the days   Over the past 2 weeks, which of these medicines did you use on your own? Select all that apply.    Marijuana   Not selected:    Prescription painkillers, such as Vicodin    Stimulants, such as Ritalin or Adderall    Sedatives or tranquilizers, such as sleeping pills or Valium    Cocaine or crack    Club drugs, such as Ecstasy    Hallucinogens, such as " "LSD    Heroin    Inhalants or solvents, such as glue    Methamphetamines, such as speed   Over the past 2 weeks, how often did you use marijuana? Select one.    Nearly every day   Not selected:    1 to 2 days    Several days    More than half the days   Think about all of the symptoms you've shared with us today. How long have you been feeling this way? Select one.    More than a year   Not selected:    Less than a year    I'm not sure   These last few questions help us make sure your treatment plan is safe for you. Do you have any of these conditions? Select all that apply.    None of these   Not selected:    Uncorrected or persistent electrolyte abnormalities, such as potassium, sodium, calcium or magnesium    QT prolongation    Congenital long QT syndrome (LQTS)    Ventricular arrhythmias, such as ventricular fibrillation or ventricular tachycardia    Bradycardia (low heart rate)    Recent heart attack    Congestive heart failure (CHF)   Do any of these apply to you now or in the recent past? \"Cold turkey\" here means stopping a medication suddenly rather than slowly taking lower and lower doses until you're off the medication. Select all that apply.    None of these   Not selected:    Seizure disorder    Bulimia or anorexia    Liver disease    Alcohol abuse    Stopped using alcohol \"cold turkey\"    Stopped using a sedative \"cold turkey\"    Stopped using an anti-seizure drug \"cold turkey\"    Stopped using a benzodiazepine drug (Klonopin, Valium, Ativan, Xanax) \"cold turkey\"   Do any of the following apply to you? Select all that apply.    None of these   Not selected:    I'm currently taking pimozide    I'm currently taking thioridazine    I've taken an MAO inhibitor in the last 14 days    I've taken linezolid or IV methylene blue in the last 14 days   Are you taking any other medications, vitamins, or supplements? Select one.    Yes   Not selected:    No   Have you ever had an allergic or bad reaction to any " medication? Select one.    No   Not selected:    Yes   If medication is recommended as part of your treatment plan, is that something you're willing to try? Select one.    Yes   Not selected:    No   Do you need a doctor's note? A doctor's note confirms that you received care today and states when you can return to school or work. It does not contain information about your diagnosis or treatment plan. Your provider will make the final decision on whether to give you a doctor's note. Doctor's notes CANNOT be backdated. Select one.    No   Not selected:    Today only (1 day)    Today and tomorrow (2 days)    3 days   What is the main reason you're taking this interview today?    __Have been much more restless and anxious that usual over the past few months. 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   Recent relationship stress has compounded with work pressure and has affected my mental well-being__   ----------   Medical history   Medical history data does not currently exist for this patient.

## 2023-06-08 ENCOUNTER — TELEMEDICINE (OUTPATIENT)
Dept: PSYCHIATRY | Facility: CLINIC | Age: 23
End: 2023-06-08
Payer: COMMERCIAL

## 2023-06-08 VITALS — BODY MASS INDEX: 21.18 KG/M2 | WEIGHT: 165 LBS

## 2023-06-08 DIAGNOSIS — F41.1 GENERALIZED ANXIETY DISORDER: Primary | ICD-10-CM

## 2023-06-08 RX ORDER — SERTRALINE HYDROCHLORIDE 25 MG/1
25 TABLET, FILM COATED ORAL DAILY
Qty: 7 TABLET | Refills: 0 | Status: SHIPPED | OUTPATIENT
Start: 2023-06-08 | End: 2023-06-15

## 2023-06-08 NOTE — PROGRESS NOTES
This provider is located at the [facility] (through [facility]), [address], [facility], [address] using a secure [employer] Video Visit through [employer]. Patient is being seen remotely via telehealth at their home address [address] [place] [address], and stated they are in a secure environment for this session. The patient's condition being diagnosed/treated is appropriate for telemedicine. The provider identified herself as well as her credentials.   The patient, and/or patients guardian, consent to be seen remotely, and when consent is given they understand that the consent allows for patient identifiable information to be sent to a third party as needed.   They may refuse to be seen remotely at any time. The electronic data is encrypted and password protected, and the patient and/or guardian has been advised of the potential risks to privacy not withstanding such measures.    You have chosen to receive care through a telehealth visit.  Do you consent to use a video/audio connection for your medical care today? Yes    Patient identifiers utilized: Name and date of birth.    Patient verbally confirmed consent for today's encounter:  June 8, 2023    Subjective   Walter Arora is a 23 y.o. male who presents today for initial evaluation     Chief Complaint:    Chief Complaint   Patient presents with    Psychiatric Evaluation        History of Present Illness:    History of Present Illness  Walter is a 22 y/o  male seen to establish behavioral health services. He is referred for anxiety. He reports 2-4 month onset of symptoms and felt it was time to have symptoms addressed/treated.      Medical history:  denies    Surgical history:  denies    Illicit substance use:  Past history of THC via vape (delta 9) use until 10 days ago    ETOH:  denies    Tobacco/vape:  Past history of THC use via vape (delta 9) until 10 days ago    Psychosocial history:  Born:  "[place], [place]     Raised by: parents    Siblings: 1 older sister    Describes childhood as: \"really good\"    Abuse history:  \" Physical: denies  \" Emotional: denies  \" Mental: denies  \" Sexual: denies  \" Rape: denies    Highest education level achieved: master's degree    Work history: investment [employer]    History of bullying? denies    Developmental delays/Special education classes: denies     history: denies    Legal history/previous chargers or incarcerations: denies    Marital status: single    Number of children: none    Self-harming behavior: denies    Impulsive or risky behavior: denies    Suicidal attempts: denies    Family history of suicide attempts/completion: denies    Social support: close friends, parents    Previous diagnoses: none    Inpatient psychiatric admissions: denies    History of psychotherapy: denies    History of behavioral health treatment: denies    Medications trialed: none    Family mental health: denies      Symptom burden:  Sleep: lays down 2230, wakes up at 0430 ready to go, and is not tired; this has been ongoing for 3-4 months    Appetite: eats well    Anxiety: obsessive worrying about certain things, feels less relaxed in certain situations, fixates on thoughts, heart races, mildly for one year, more prevalent x 2-3 months; states he has started a new job and worries it is not going well.    Paranoia: denies    Hallucinations: denies    Mood fluctuations/irritability: denies           The following portions of the patient's history were reviewed and updated as appropriate: allergies, current medications, past family history, past medical history, past social history, past surgical history and problem list.    Past Psychiatric History:  Began Treatment: has never had treatment  Diagnoses: no previous diagnosis  Psychiatrist:Denies  Therapist:Denies  Admission History:Denies  Medication Trials: none  Self Harm: Denies  Suicide Attempts:Denies   Psychosis, Anxiety, " Depression:  not applicable    Past Medical History:  Past Medical History:   Diagnosis Date    Seasonal allergic rhinitis due to pollen 8/1/2022       Substance Abuse History:   Types: THC Delta 9 via vape until 10 days ago  Withdrawal Symptoms:Denies  Longest Period Sober:Not Applicable   AA: Not applicable     Social History:  Social History     Socioeconomic History    Marital status: Single   Tobacco Use    Smoking status: Never    Smokeless tobacco: Never   Vaping Use    Vaping Use: Never used   Substance and Sexual Activity    Alcohol use: Yes     Comment: < 5 weekly    Drug use: Never    Sexual activity: Not Currently     Partners: Female       Family History:  History reviewed. No pertinent family history.    Past Surgical History:  History reviewed. No pertinent surgical history.    Problem List:  Patient Active Problem List   Diagnosis    Seasonal allergic rhinitis due to pollen       Allergy:   No Known Allergies     Current Medications:   No current outpatient medications on file.     No current facility-administered medications for this visit.       Review of Systems:    Review of Systems   Psychiatric/Behavioral:  Positive for stress. The patient is nervous/anxious.    All other systems reviewed and are negative.      Physical Exam:   Physical Exam  Constitutional:       General: He is awake.      Appearance: Normal appearance. He is well-developed, well-groomed and normal weight.   Neurological:      Mental Status: He is alert and oriented to person, place, and time.   Psychiatric:         Attention and Perception: Attention and perception normal.         Mood and Affect: Mood and affect normal.         Speech: Speech normal.         Behavior: Behavior normal. Behavior is cooperative.         Thought Content: Thought content normal.         Cognition and Memory: Cognition and memory normal.         Judgment: Judgment normal.       Vitals:  Weight 74.8 kg (165 lb). Body mass index is 21.18 kg/m².  Due  to extenuating circumstances and possible current health risks associated with the patient being present in a clinical setting (with current health restrictions in place in regards to possible COVID 19 transmission/exposure), the patient was seen remotely today via a MyChart Video Visit through [facility].  Unable to obtain vital signs due to nature of remote visit.  Height stated at 74 inches.  Weight stated at 165 pounds.    Last 3 Blood Pressure Readings:  BP Readings from Last 3 Encounters:   09/05/22 141/78   08/01/22 112/80   06/19/22 140/85       PHQ-9 Score:   PHQ-9 Total Score:      MILANA-7 Score:   Feeling nervous, anxious or on edge: (P) Nearly every day  Not being able to stop or control worrying: (P) Nearly every day  Worrying too much about different things: (P) More than half the days  Trouble Relaxing: (P) Nearly every day  Being so restless that it is hard to sit still: (P) More than half the days  Feeling afraid as if something awful might happen: (P) More than half the days  Becoming easily annoyed or irritable: (P) Several days  MILANA 7 Total Score: (P) 16  If you checked any problems, how difficult have these problems made it for you to do your work, take care of things at home, or get along with other people: (P) Somewhat difficult     Mental Status Exam:   Hygiene:   good  Cooperation:  Cooperative  Eye Contact:  Good  Psychomotor Behavior:  Appropriate  Affect:  Appropriate  Mood: normal  Hopelessness: Denies  Speech:  Normal  Thought Process:  Goal directed and Linear  Thought Content:  Mood congruent  Suicidal:  None  Homicidal:  None  Hallucinations:  None  Delusion:  None  Memory:  Intact  Orientation:  Person, Place, Time, and Situation  Reliability:  good  Insight:  Good  Judgement:  Good  Impulse Control:  Good  Physical/Medical Issues:  No        Lab Results:   No visits with results within 6 Month(s) from this visit.   Latest known visit with results is:   Admission on 09/05/2022, Discharged  on 09/05/2022   Component Date Value Ref Range Status    SARS-CoV-2 CARROL 09/05/2022 Not Detected  Not Detected Final    Rapid Influenza A Ag 09/05/2022 Negative  Negative Final    Rapid Influenza B Ag 09/05/2022 Negative  Negative Final    Internal Control 09/05/2022 Passed  Passed Final    Lot Number 09/05/2022 [id number]   Final    Expiration Date 09/05/2022 12/31/2023   Final         Assessment & Plan   Assessment     ICD-10-CM ICD-9-CM   1. Generalized anxiety disorder  F41.1 300.02         SSRI therapy was discussed and through shared decision making patient opted to trial Zoloft at this time. Possible side effects discussed with this patient along with risk versus benefits.  Patient gave informed consent.    Start Zoloft 25 mg daily for 7 days, then increase to 50 mg daily    Check CBC, CMP, lipid panel, TSH, free T4, vitamin B12, methylmalonic acid, magnesium level    GOALS:  Short Term Goals: Patient will be compliant with medication, and patient will have no significant medication related side effects.  Patient will be engaged in psychotherapy as indicated.  Patient will report subjective improvement of symptoms.  Long term goals: To stabilize mood and treat/improve subjective symptoms, the patient will stay out of the hospital, the patient will be at an optimal level of functioning, and the patient will take all medications as prescribed.  The patient/guardian verbalized understanding and agreement with goals that were mutually set.      TREATMENT PLAN: Continue supportive psychotherapy efforts and medications as indicated.  Pharmacological and Non-Pharmacological treatment options discussed during today's visit. Patient/Guardian acknowledged and verbally consented with current treatment plan and was educated on the importance of compliance with treatment and follow-up appointments.      MEDICATION ISSUES:  Discussed medication options and treatment plan of prescribed medication as well as the risks, benefits,  any black box warnings, and side effects including potential falls, possible impaired driving, and metabolic adversities among others. Patient is agreeable to call the office with any worsening of symptoms or onset of side effects, or if any concerns or questions arise.  The contact information for the office is made available to the patient. Patient is agreeable to call 911 or go to the nearest ER should they begin having any SI/HI, or if any urgent concerns arise. No medication side effects or related complaints today.     Medication Changes During Visit:   There are no discontinued medications.   No orders of the defined types were placed in this encounter.      Follow Up Appointment:   Return in about 4 weeks (around 7/6/2023) for Recheck.           This document has been electronically signed by JAMES Velázquez  June 8, 2023 13:09 EDT    Dictated Utilizing Dragon Dictation: Part of this note may be an electronic transcription/translation of spoken language to printed text using the Dragon Dictation System.

## 2023-08-02 ENCOUNTER — LAB (OUTPATIENT)
Dept: LAB | Facility: HOSPITAL | Age: 23
End: 2023-08-02
Payer: COMMERCIAL

## 2023-08-02 ENCOUNTER — OFFICE VISIT (OUTPATIENT)
Dept: INTERNAL MEDICINE | Facility: CLINIC | Age: 23
End: 2023-08-02
Payer: COMMERCIAL

## 2023-08-02 VITALS
WEIGHT: 166 LBS | SYSTOLIC BLOOD PRESSURE: 122 MMHG | HEART RATE: 68 BPM | BODY MASS INDEX: 20.64 KG/M2 | DIASTOLIC BLOOD PRESSURE: 70 MMHG | HEIGHT: 75 IN | TEMPERATURE: 98.2 F

## 2023-08-02 DIAGNOSIS — Z11.3 ROUTINE SCREENING FOR STI (SEXUALLY TRANSMITTED INFECTION): ICD-10-CM

## 2023-08-02 DIAGNOSIS — F32.5 MAJOR DEPRESSIVE DISORDER WITH SINGLE EPISODE, IN FULL REMISSION: Chronic | ICD-10-CM

## 2023-08-02 DIAGNOSIS — Z11.4 SCREENING FOR HIV (HUMAN IMMUNODEFICIENCY VIRUS): ICD-10-CM

## 2023-08-02 DIAGNOSIS — R06.81 APNEA: ICD-10-CM

## 2023-08-02 DIAGNOSIS — Z00.00 WELL ADULT EXAM: Primary | ICD-10-CM

## 2023-08-02 DIAGNOSIS — F41.1 GAD (GENERALIZED ANXIETY DISORDER): Chronic | ICD-10-CM

## 2023-08-02 DIAGNOSIS — J30.1 SEASONAL ALLERGIC RHINITIS DUE TO POLLEN: Chronic | ICD-10-CM

## 2023-08-02 PROBLEM — F41.9 ANXIETY: Status: ACTIVE | Noted: 2023-08-02

## 2023-08-02 PROBLEM — F32.A DEPRESSION: Status: ACTIVE | Noted: 2023-08-02

## 2023-08-02 LAB
HIV1+2 AB SER QL: NORMAL
RPR SER QL: NORMAL

## 2023-08-02 PROCEDURE — G0432 EIA HIV-1/HIV-2 SCREEN: HCPCS

## 2023-08-02 PROCEDURE — 87591 N.GONORRHOEAE DNA AMP PROB: CPT

## 2023-08-02 PROCEDURE — 87491 CHLMYD TRACH DNA AMP PROBE: CPT

## 2023-08-02 PROCEDURE — 86592 SYPHILIS TEST NON-TREP QUAL: CPT

## 2023-08-03 LAB
C TRACH RRNA SPEC QL NAA+PROBE: NEGATIVE
N GONORRHOEA RRNA SPEC QL NAA+PROBE: NEGATIVE

## 2023-08-09 ENCOUNTER — TELEMEDICINE (OUTPATIENT)
Dept: PSYCHIATRY | Facility: CLINIC | Age: 23
End: 2023-08-09
Payer: COMMERCIAL

## 2023-08-09 DIAGNOSIS — F41.1 GENERALIZED ANXIETY DISORDER: Primary | ICD-10-CM

## 2023-08-09 NOTE — PROGRESS NOTES
This provider is located at the [facility] (through [facility]), [address], [facility], [address] using a secure [employer] Video Visit through [employer]. Patient is being seen remotely via telehealth at his work address in [place], and stated they are in a secure environment for this session. The patient's condition being diagnosed/treated is appropriate for telemedicine. The provider identified herself as well as her credentials.   The patient, and/or patients guardian, consent to be seen remotely, and when consent is given they understand that the consent allows for patient identifiable information to be sent to a third party as needed.   They may refuse to be seen remotely at any time. The electronic data is encrypted and password protected, and the patient and/or guardian has been advised of the potential risks to privacy not withstanding such measures.    You have chosen to receive care through a telehealth visit.  Do you consent to use a video/audio connection for your medical care today? Yes    Patient identifiers utilized: Name and date of birth.    Patient verbally confirmed consent for today's encounter:  August 9, 2023    Subjective   Walter Arora II is a 23 y.o. male who presents today for follow up    Chief Complaint:    Chief Complaint   Patient presents with    Anxiety    Med Management        History of Present Illness:    History of Present Illness  Walter is a 22 y/o  male seen in follow up for behavioral health services. He is being treated for MILANA and is on Zoloft 50 mg. Today, he is seen to discuss efficacy of SSRI therapy. States previous side effects have resolved. States he is sleeping well. States his biggest meal of the day is supper.     States he feels a lack of motivation and has difficulty getting motivated in the morning but feels good throughout the day. Loss of motivation at work, not in personal life, utilizing coping  skills.    Symptoms:  Sleep: sleeping well, states he takes the medication at night around 2130  Appetite: eats well  Anxiety: improved, things he normally is fixated on are no longer bothering him    Original symptom burden:  Sleep: lays down 2230, wakes up at 0430 ready to go, and is not tired; this has been ongoing for 3-4 months  Appetite: eats well  Anxiety: obsessive worrying about certain things, feels less relaxed in certain situations, fixates on thoughts, heart races, mildly for one year, more prevalent x 2-3 months; states he has started a new job and worries it is not going well.    MILANA 7 and PHQ 9 scores (7)             The following portions of the patient's history were reviewed and updated as appropriate: allergies, current medications, past family history, past medical history, past social history, past surgical history and problem list.    Past Psychiatric History:  Began Treatment: 2023  Diagnoses: no diagnosis prior to seeing this provider, diagnosed with MILANA by this provider  Psychiatrist:Denies  Therapist:Denies  Admission History:Denies  Medication Trials: Zoloft  Self Harm: Denies  Suicide Attempts:Denies   Psychosis, Anxiety, Depression:  not applicable    Past Medical History:  Past Medical History:   Diagnosis Date    Anxiety 23    Depression 23    Seasonal allergic rhinitis due to pollen 2022       Substance Abuse History:   Types: THC [employer] 9 via vape until 10 days ago  Withdrawal Symptoms:Denies  Longest Period Sober:Not Applicable   AA: Not applicable     Social History:  Social History     Socioeconomic History    Marital status: Single   Tobacco Use    Smoking status: Never    Smokeless tobacco: Never   Vaping Use    Vaping Use: Never used   Substance and Sexual Activity    Alcohol use: Yes     Alcohol/week: 2.0 standard drinks     Types: 1 Glasses of wine, 1 Cans of beer per week     Comment: < 5 weekly    Drug use: Never    Sexual activity: Yes      Partners: Female     Birth control/protection: Condom, Pill       Family History:  Family History   Problem Relation Age of Onset    Alcohol abuse Maternal Grandfather     Anxiety disorder Maternal Grandfather     Depression Maternal Grandfather     Drug abuse Maternal Grandfather        Past Surgical History:  History reviewed. No pertinent surgical history.    Problem List:  Patient Active Problem List   Diagnosis    Seasonal allergic rhinitis due to pollen    MILANA (generalized anxiety disorder)    Major depressive disorder with single episode, in full remission       Allergy:   No Known Allergies     Current Medications:   Current Outpatient Medications   Medication Sig Dispense Refill    sertraline (Zoloft) 50 MG tablet Take 1 tablet by mouth Daily for 60 days. 30 tablet 1     No current facility-administered medications for this visit.       Review of Systems:    Review of Systems   Constitutional:  Activity change: reports lack of motivation at work and sexual side effects of Zoloft.   Psychiatric/Behavioral: Negative.     All other systems reviewed and are negative.      Physical Exam:   Physical Exam  Constitutional:       General: He is awake.      Appearance: Normal appearance. He is well-developed, well-groomed and normal weight.   Neurological:      Mental Status: He is alert and oriented to person, place, and time.   Psychiatric:         Attention and Perception: Attention and perception normal.         Mood and Affect: Mood and affect normal.         Speech: Speech normal.         Behavior: Behavior normal. Behavior is cooperative.         Thought Content: Thought content normal.         Cognition and Memory: Cognition and memory normal.         Judgment: Judgment normal.       Vitals:  There were no vitals taken for this visit. There is no height or weight on file to calculate BMI.  Due to extenuating circumstances and possible current health risks associated with the patient being present in a  clinical setting (with current health restrictions in place in regards to possible COVID 19 transmission/exposure), the patient was seen remotely today via a MyChart Video Visit through [facility].  Unable to obtain vital signs due to nature of remote visit.  Height stated at 74 inches.  Weight stated at 165 pounds.    Last 3 Blood Pressure Readings:  BP Readings from Last 3 Encounters:   08/02/23 122/70   09/05/22 141/78   08/01/22 112/80       PHQ-9 Score:   PHQ-9 Total Score:      MILANA-7 Score:   Feeling nervous, anxious or on edge: (P) Several days  Not being able to stop or control worrying: (P) Several days  Worrying too much about different things: (P) More than half the days  Trouble Relaxing: (P) Several days  Being so restless that it is hard to sit still: (P) More than half the days  Feeling afraid as if something awful might happen: (P) Several days  Becoming easily annoyed or irritable: (P) More than half the days  MILANA 7 Total Score: (P) 10  If you checked any problems, how difficult have these problems made it for you to do your work, take care of things at home, or get along with other people: (P) Somewhat difficult     Mental Status Exam:   Hygiene:   good  Cooperation:  Cooperative  Eye Contact:  Good  Psychomotor Behavior:  Appropriate  Affect:  Appropriate  Mood: normal  Hopelessness: Denies  Speech:  Normal  Thought Process:  Goal directed and Linear  Thought Content:  Mood congruent  Suicidal:  None  Homicidal:  None  Hallucinations:  None  Delusion:  None  Memory:  Intact  Orientation:  Person, Place, Time, and Situation  Reliability:  good  Insight:  Good  Judgement:  Good  Impulse Control:  Good  Physical/Medical Issues:  No        Lab Results:   Lab on 08/02/2023   Component Date Value Ref Range Status    HIV-1/ HIV-2 08/02/2023 Non-Reactive  Non-Reactive Final    A non-reactive test result does not preclude the possibility of exposure to HIV or infection with HIV. An antibody response to recent  exposure may take several months to reach detectable levels.    Chlamydia trachomatis, CARROL 08/02/2023 Negative  Negative Final    Neisseria gonorrhoeae, CARROL 08/02/2023 Negative  Negative Final    RPR 08/02/2023 Non-Reactive  Non-Reactive Final   Results Encounter on 06/13/2023   Component Date Value Ref Range Status    Glucose 06/27/2023 95  65 - 99 mg/dL Final    BUN 06/27/2023 19  6 - 20 mg/dL Final    Creatinine 06/27/2023 1.00  0.76 - 1.27 mg/dL Final    Sodium 06/27/2023 138  136 - 145 mmol/L Final    Potassium 06/27/2023 3.9  3.5 - 5.2 mmol/L Final    Chloride 06/27/2023 100  98 - 107 mmol/L Final    CO2 06/27/2023 23.8  22.0 - 29.0 mmol/L Final    Calcium 06/27/2023 9.2  8.6 - 10.5 mg/dL Final    Total Protein 06/27/2023 6.8  6.0 - 8.5 g/dL Final    Albumin 06/27/2023 4.6  3.5 - 5.2 g/dL Final    ALT (SGPT) 06/27/2023 32  1 - 41 U/L Final    AST (SGOT) 06/27/2023 29  1 - 40 U/L Final    Alkaline Phosphatase 06/27/2023 54  39 - 117 U/L Final    Total Bilirubin 06/27/2023 1.2  0.0 - 1.2 mg/dL Final    Globulin 06/27/2023 2.2  gm/dL Final    A/G Ratio 06/27/2023 2.1  g/dL Final    BUN/Creatinine Ratio 06/27/2023 19.0  7.0 - 25.0 Final    Anion Gap 06/27/2023 14.2  5.0 - 15.0 mmol/L Final    eGFR 06/27/2023 107.1  >60.0 mL/min/1.73 Final    Total Cholesterol 06/27/2023 159  0 - 200 mg/dL Final    Triglycerides 06/27/2023 42  0 - 150 mg/dL Final    HDL Cholesterol 06/27/2023 66 (H)  40 - 60 mg/dL Final    LDL Cholesterol  06/27/2023 84  0 - 100 mg/dL Final    VLDL Cholesterol 06/27/2023 9  5 - 40 mg/dL Final    LDL/HDL Ratio 06/27/2023 1.28   Final    Magnesium 06/27/2023 2.0  1.6 - 2.6 mg/dL Final    Vitamin B-12 06/27/2023 886  211 - 946 pg/mL Final    Methylmalonic Acid 06/27/2023 115  0 - 378 nmol/L Final    TSH 06/27/2023 1.170  0.270 - 4.200 uIU/mL Final    Free T4 06/27/2023 1.50  0.93 - 1.70 ng/dL Final    WBC 06/27/2023 8.67  3.40 - 10.80 10*3/mm3 Final    RBC 06/27/2023 4.94  4.14 - 5.80 10*6/mm3 Final     Hemoglobin 06/27/2023 16.1  13.0 - 17.7 g/dL Final    Hematocrit 06/27/2023 46.2  37.5 - 51.0 % Final    MCV 06/27/2023 93.5  79.0 - 97.0 fL Final    MCH 06/27/2023 32.6  26.6 - 33.0 pg Final    MCHC 06/27/2023 34.8  31.5 - 35.7 g/dL Final    RDW 06/27/2023 12.0 (L)  12.3 - 15.4 % Final    RDW-SD 06/27/2023 41.4  37.0 - 54.0 fl Final    MPV 06/27/2023 10.5  6.0 - 12.0 fL Final    Platelets 06/27/2023 230  140 - 450 10*3/mm3 Final    Neutrophil % 06/27/2023 72.2  42.7 - 76.0 % Final    Lymphocyte % 06/27/2023 19.5 (L)  19.6 - 45.3 % Final    Monocyte % 06/27/2023 6.7  5.0 - 12.0 % Final    Eosinophil % 06/27/2023 0.6  0.3 - 6.2 % Final    Basophil % 06/27/2023 0.8  0.0 - 1.5 % Final    Immature Grans % 06/27/2023 0.2  0.0 - 0.5 % Final    Neutrophils, Absolute 06/27/2023 6.26  1.70 - 7.00 10*3/mm3 Final    Lymphocytes, Absolute 06/27/2023 1.69  0.70 - 3.10 10*3/mm3 Final    Monocytes, Absolute 06/27/2023 0.58  0.10 - 0.90 10*3/mm3 Final    Eosinophils, Absolute 06/27/2023 0.05  0.00 - 0.40 10*3/mm3 Final    Basophils, Absolute 06/27/2023 0.07  0.00 - 0.20 10*3/mm3 Final    Immature Grans, Absolute 06/27/2023 0.02  0.00 - 0.05 10*3/mm3 Final    nRBC 06/27/2023 0.0  0.0 - 0.2 /100 WBC Final         Assessment & Plan   Assessment     ICD-10-CM ICD-9-CM   1. Generalized anxiety disorder  F41.1 300.02         Continue Zoloft 50 mg daily.        GOALS:  Short Term Goals: Patient will be compliant with medication, and patient will have no significant medication related side effects.  Patient will be engaged in psychotherapy as indicated.  Patient will report subjective improvement of symptoms.  Long term goals: To stabilize mood and treat/improve subjective symptoms, the patient will stay out of the hospital, the patient will be at an optimal level of functioning, and the patient will take all medications as prescribed.  The patient/guardian verbalized understanding and agreement with goals that were mutually  set.      TREATMENT PLAN: Continue supportive psychotherapy efforts and medications as indicated.  Pharmacological and Non-Pharmacological treatment options discussed during today's visit. Patient/Guardian acknowledged and verbally consented with current treatment plan and was educated on the importance of compliance with treatment and follow-up appointments.      MEDICATION ISSUES:  Discussed medication options and treatment plan of prescribed medication as well as the risks, benefits, any black box warnings, and side effects including potential falls, possible impaired driving, and metabolic adversities among others. Patient is agreeable to call the office with any worsening of symptoms or onset of side effects, or if any concerns or questions arise.  The contact information for the office is made available to the patient. Patient is agreeable to call 911 or go to the nearest ER should they begin having any SI/HI, or if any urgent concerns arise. No medication side effects or related complaints today.     Medication Changes During Visit:   Medications Discontinued During This Encounter   Medication Reason    sertraline (Zoloft) 25 MG tablet *Therapy completed      No orders of the defined types were placed in this encounter.      Follow Up Appointment:   Return in about 2 months (around 10/9/2023) for Recheck.           This document has been electronically signed by JAMES Velázquez  August 9, 2023 13:01 EDT    Dictated Utilizing Dragon Dictation: Part of this note may be an electronic transcription/translation of spoken language to printed text using the Dragon Dictation System.

## 2023-09-01 ENCOUNTER — TELEMEDICINE (OUTPATIENT)
Dept: SLEEP MEDICINE | Facility: CLINIC | Age: 23
End: 2023-09-01
Payer: COMMERCIAL

## 2023-09-01 VITALS — WEIGHT: 165 LBS | HEIGHT: 74 IN | BODY MASS INDEX: 21.17 KG/M2

## 2023-09-01 DIAGNOSIS — R29.818 SUSPECTED SLEEP APNEA: Primary | ICD-10-CM

## 2023-09-01 DIAGNOSIS — R51.9 MORNING HEADACHE: ICD-10-CM

## 2023-09-01 DIAGNOSIS — G47.19 EXCESSIVE DAYTIME SLEEPINESS: ICD-10-CM

## 2023-09-01 PROCEDURE — 99213 OFFICE O/P EST LOW 20 MIN: CPT | Performed by: NURSE PRACTITIONER

## 2023-09-01 NOTE — PROGRESS NOTES
Chief Complaint:   Chief Complaint   Patient presents with    Sleeping Problem       HPI:    Walter Arora II is a 23 y.o. male here establish care.  Patient sees Dr. Bennett primary care.  Medical history as below:  Past Medical History:   Diagnosis Date    Anxiety 5/25/23    Depression 5/25/23    Seasonal allergic rhinitis due to pollen 08/01/2022     Patient has a greater than 5-year history of witnessed apneas, gasping in the night, excessive daytime sleepiness, sleep talking, lack of concentration, and morning headaches usually 6-7 mornings a week.  Patient has no history of nasal fracture.  Patient had a concussion at age 1.  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    During the weekday patient will go to bed at 10 PM and awaken 6:30 AM.  Weekend going to bed at 1 AM and awakening 8 AM.  He does estimate getting 8 hours of sleep nightly he goes to sleep very quickly while he occasionally get up x1.  Patient has an Currie score of 6/24.      Did speak today about the consequences of untreated sleep apnea such as hypertension, atrial fibrillation, stroke, early onset dementia or sudden cardiac death.  We also discussed different therapies available to him such as CPAP, MAD, ENT referral or inspire.  Patient verbalizes understanding.    Social history  This is a very pleasant 23-year-old male.  Patient is in investment banking.  Patient is a non-smoker.  Patient does drink alcohol 2-4 times a month and will smoke marijuana 5 times weekly.  He has 1 regular coffee daily and 1 energy drink at noon approximately 3 times a week.    No past surgical history on file.    Current medications are:   Current Outpatient Medications:     sertraline (Zoloft) 50 MG tablet, Take 1 tablet by mouth Daily for 120 days., Disp: 30 tablet, Rfl: 3.    Family History   Problem Relation Age of Onset    Alcohol abuse Maternal Grandfather     Anxiety disorder  Maternal Grandfather     Depression Maternal Grandfather     Drug abuse Maternal Grandfather        The patient's relevant past medical, surgical, family and social history were reviewed and updated in Epic as appropriate.       Review of Systems   Constitutional:  Positive for fatigue.   HENT:  Positive for rhinorrhea.    Allergic/Immunologic: Positive for environmental allergies.   Neurological:  Positive for headaches.   Psychiatric/Behavioral:  Positive for dysphoric mood and sleep disturbance. The patient is nervous/anxious and is hyperactive.    All other systems reviewed and are negative.      Objective:    Physical Exam  Constitutional:       Appearance: Normal appearance.   HENT:      Head: Normocephalic and atraumatic.   Pulmonary:      Effort: Pulmonary effort is normal. No respiratory distress.   Neurological:      Mental Status: He is alert and oriented to person, place, and time.   Psychiatric:         Mood and Affect: Mood normal.         Behavior: Behavior normal.         Thought Content: Thought content normal.         Judgment: Judgment normal.           ASSESSMENT/PLAN    Diagnoses and all orders for this visit:    1. Suspected sleep apnea (Primary)  -     Home Sleep Study; Future    2. Excessive daytime sleepiness  -     Home Sleep Study; Future    3. Morning headache  -     Home Sleep Study; Future        Counseled patient regarding multimodal approach with healthy nutrition, healthy sleep, regular physical activity, social activities, counseling, and medications. Encouraged to practice lateral sleep position. Avoid alcohol and sedatives close to bedtime.      Certainly has a strong story for sleep apnea and due to the consequences of untreated sleep apnea we will move forward with HST and follow-up as appropriate.  Patient gave consent for video visit.  I have reviewed the results of my evaluation and impression and discussed my recommendations in detail with the patient.      Signed by  Megha  POOJA Hsieh, APRN    September 1, 2023      CC: Jean Claude Bennett MD Holestol, Bjorn L, MD

## 2023-10-04 ENCOUNTER — TELEMEDICINE (OUTPATIENT)
Dept: PSYCHIATRY | Facility: CLINIC | Age: 23
End: 2023-10-04
Payer: COMMERCIAL

## 2023-10-04 DIAGNOSIS — F41.1 GENERALIZED ANXIETY DISORDER: Primary | ICD-10-CM

## 2023-10-04 RX ORDER — BUPROPION HYDROCHLORIDE 75 MG/1
75 TABLET ORAL 2 TIMES DAILY
Qty: 60 TABLET | Refills: 0 | Status: SHIPPED | OUTPATIENT
Start: 2023-10-04

## 2023-10-04 NOTE — PROGRESS NOTES
This provider is located at the [facility] (through [facility]), [address], [place], [address] using a secure [employer] Video Visit through [employer]. Patient is being seen remotely via telehealth at his work address in [place], and stated they are in a secure environment for this session. The patient's condition being diagnosed/treated is appropriate for telemedicine. The provider identified herself as well as her credentials.   The patient, and/or patients guardian, consent to be seen remotely, and when consent is given they understand that the consent allows for patient identifiable information to be sent to a third party as needed.   They may refuse to be seen remotely at any time. The electronic data is encrypted and password protected, and the patient and/or guardian has been advised of the potential risks to privacy not withstanding such measures.    You have chosen to receive care through a telehealth visit.  Do you consent to use a video/audio connection for your medical care today? Yes    Patient identifiers utilized: Name and date of birth.    Patient verbally confirmed consent for today's encounter:  October 4, 2023    Martin Arora II is a 23 y.o. male who presents today for follow up    Chief Complaint:    Chief Complaint   Patient presents with    Anxiety    Med Management        History of Present Illness:    History of Present Illness  Walter is a 24 y/o  male seen in follow up for behavioral health services. He is being treated for MILANA and is on Zoloft 50 mg. C/O decreased attention span, day time fatigue, lack of motivation. States he has recently been diagnosed with sleep apnea, states its a neurological sleep apnea and he is working with his provider on a treatment plan. Patient educated sleep apnea can contribute to his complaints to this provider of headache, mood disturbance, irritability, low energy, memory  impairment/attention span. Patient educated on the importance of treating sleep apnea to assist in alleviating these symptoms. Patient verbalizes understanding. States lack of motivation is more pronounced after Zoloft was started.       Symptoms:  Sleep: sleeping well, states he takes the medication at night around 2130, but has daytime fatigue  Appetite: eats well  Anxiety: improved, things he normally is fixated on are no longer bothering him    Original symptom burden:  Sleep: lays down 2230, wakes up at 0430 ready to go, and is not tired; this has been ongoing for 3-4 months  Appetite: eats well  Anxiety: obsessive worrying about certain things, feels less relaxed in certain situations, fixates on thoughts, heart races, mildly for one year, more prevalent x 2-3 months; states he has started a new job and worries it is not going well.      The following portions of the patient's history were reviewed and updated as appropriate: allergies, current medications, past family history, past medical history, past social history, past surgical history and problem list.    Past Psychiatric History:  Began Treatment: 2023  Diagnoses: no diagnosis prior to seeing this provider, diagnosed with MILANA by this provider  Psychiatrist:Denies  Therapist:Denies  Admission History:Denies  Medication Trials: Zoloft  Self Harm: Denies  Suicide Attempts:Denies   Psychosis, Anxiety, Depression:  not applicable    Past Medical History:  Past Medical History:   Diagnosis Date    Anxiety 23    Depression 23    Seasonal allergic rhinitis due to pollen 2022       Substance Abuse History:   Types: THC [employer] 9 via vape until 10 days ago  Withdrawal Symptoms:Denies  Longest Period Sober:Not Applicable   AA: Not applicable     Social History:  Social History     Socioeconomic History    Marital status: Single   Tobacco Use    Smoking status: Never    Smokeless tobacco: Never   Vaping Use    Vaping Use: Never used    Substance and Sexual Activity    Alcohol use: Yes     Alcohol/week: 2.0 standard drinks     Types: 1 Glasses of wine, 1 Cans of beer per week     Comment: < 5 weekly    Drug use: Never    Sexual activity: Yes     Partners: Female     Birth control/protection: Condom, Pill       Family History:  Family History   Problem Relation Age of Onset    Alcohol abuse Maternal Grandfather     Anxiety disorder Maternal Grandfather     Depression Maternal Grandfather     Drug abuse Maternal Grandfather        Past Surgical History:  History reviewed. No pertinent surgical history.    Problem List:  Patient Active Problem List   Diagnosis    Seasonal allergic rhinitis due to pollen    MILANA (generalized anxiety disorder)    Major depressive disorder with single episode, in full remission       Allergy:   No Known Allergies     Current Medications:   Current Outpatient Medications   Medication Sig Dispense Refill    sertraline (Zoloft) 50 MG tablet Take 1 tablet by mouth Daily for 120 days. 30 tablet 3     No current facility-administered medications for this visit.       Review of Systems:    Review of Systems   Constitutional:  Positive for activity change and fatigue.   Psychiatric/Behavioral:  Positive for decreased concentration.    All other systems reviewed and are negative.      Physical Exam:   Physical Exam  Constitutional:       General: He is awake.      Appearance: Normal appearance. He is well-developed, well-groomed and normal weight.   Neurological:      Mental Status: He is alert and oriented to person, place, and time.   Psychiatric:         Attention and Perception: Attention and perception normal.         Mood and Affect: Mood and affect normal.         Speech: Speech normal.         Behavior: Behavior normal. Behavior is cooperative.         Thought Content: Thought content normal.         Cognition and Memory: Cognition and memory normal.         Judgment: Judgment normal.       Vitals:  There were no vitals  taken for this visit. There is no height or weight on file to calculate BMI.  Due to extenuating circumstances and possible current health risks associated with the patient being present in a clinical setting (with current health restrictions in place in regards to possible COVID 19 transmission/exposure), the patient was seen remotely today via a MyChart Video Visit through [facility].  Unable to obtain vital signs due to nature of remote visit.  Height stated at 74 inches.  Weight stated at 165 pounds.    Last 3 Blood Pressure Readings:  BP Readings from Last 3 Encounters:   08/02/23 122/70   09/05/22 141/78   08/01/22 112/80       PHQ-9 Score:   PHQ-9 Total Score:      MILANA-7 Score:   Feeling nervous, anxious or on edge: (P) Not at all  Not being able to stop or control worrying: (P) Not at all  Worrying too much about different things: (P) Not at all  Trouble Relaxing: (P) Not at all  Being so restless that it is hard to sit still: (P) Not at all  Feeling afraid as if something awful might happen: (P) Not at all  Becoming easily annoyed or irritable: (P) Not at all  MILANA 7 Total Score: (P) 0  If you checked any problems, how difficult have these problems made it for you to do your work, take care of things at home, or get along with other people: (P) Not difficult at all     Mental Status Exam:   Hygiene:   good  Cooperation:  Cooperative  Eye Contact:  Good  Psychomotor Behavior:  Appropriate  Affect:  Appropriate  Mood: normal  Hopelessness: Denies  Speech:  Normal  Thought Process:  Goal directed and Linear  Thought Content:  Mood congruent  Suicidal:  None  Homicidal:  None  Hallucinations:  None  Delusion:  None  Memory:  Intact  Orientation:  Person, Place, Time, and Situation  Reliability:  good  Insight:  Good  Judgement:  Good  Impulse Control:  Good  Physical/Medical Issues:  Yes sleep apnea        Lab Results:   Lab on 08/02/2023   Component Date Value Ref Range Status    HIV-1/ HIV-2 08/02/2023 Non-Reactive   Non-Reactive Final    A non-reactive test result does not preclude the possibility of exposure to HIV or infection with HIV. An antibody response to recent exposure may take several months to reach detectable levels.    Chlamydia trachomatis, CARROL 08/02/2023 Negative  Negative Final    Neisseria gonorrhoeae, CARROL 08/02/2023 Negative  Negative Final    RPR 08/02/2023 Non-Reactive  Non-Reactive Final   Results Encounter on 06/13/2023   Component Date Value Ref Range Status    Glucose 06/27/2023 95  65 - 99 mg/dL Final    BUN 06/27/2023 19  6 - 20 mg/dL Final    Creatinine 06/27/2023 1.00  0.76 - 1.27 mg/dL Final    Sodium 06/27/2023 138  136 - 145 mmol/L Final    Potassium 06/27/2023 3.9  3.5 - 5.2 mmol/L Final    Chloride 06/27/2023 100  98 - 107 mmol/L Final    CO2 06/27/2023 23.8  22.0 - 29.0 mmol/L Final    Calcium 06/27/2023 9.2  8.6 - 10.5 mg/dL Final    Total Protein 06/27/2023 6.8  6.0 - 8.5 g/dL Final    Albumin 06/27/2023 4.6  3.5 - 5.2 g/dL Final    ALT (SGPT) 06/27/2023 32  1 - 41 U/L Final    AST (SGOT) 06/27/2023 29  1 - 40 U/L Final    Alkaline Phosphatase 06/27/2023 54  39 - 117 U/L Final    Total Bilirubin 06/27/2023 1.2  0.0 - 1.2 mg/dL Final    Globulin 06/27/2023 2.2  gm/dL Final    A/G Ratio 06/27/2023 2.1  g/dL Final    BUN/Creatinine Ratio 06/27/2023 19.0  7.0 - 25.0 Final    Anion Gap 06/27/2023 14.2  5.0 - 15.0 mmol/L Final    eGFR 06/27/2023 107.1  >60.0 mL/min/1.73 Final    Total Cholesterol 06/27/2023 159  0 - 200 mg/dL Final    Triglycerides 06/27/2023 42  0 - 150 mg/dL Final    HDL Cholesterol 06/27/2023 66 (H)  40 - 60 mg/dL Final    LDL Cholesterol  06/27/2023 84  0 - 100 mg/dL Final    VLDL Cholesterol 06/27/2023 9  5 - 40 mg/dL Final    LDL/HDL Ratio 06/27/2023 1.28   Final    Magnesium 06/27/2023 2.0  1.6 - 2.6 mg/dL Final    Vitamin B-12 06/27/2023 886  211 - 946 pg/mL Final    Methylmalonic Acid 06/27/2023 115  0 - 378 nmol/L Final    TSH 06/27/2023 1.170  0.270 - 4.200 uIU/mL Final     Free T4 06/27/2023 1.50  0.93 - 1.70 ng/dL Final    WBC 06/27/2023 8.67  3.40 - 10.80 10*3/mm3 Final    RBC 06/27/2023 4.94  4.14 - 5.80 10*6/mm3 Final    Hemoglobin 06/27/2023 16.1  13.0 - 17.7 g/dL Final    Hematocrit 06/27/2023 46.2  37.5 - 51.0 % Final    MCV 06/27/2023 93.5  79.0 - 97.0 fL Final    MCH 06/27/2023 32.6  26.6 - 33.0 pg Final    MCHC 06/27/2023 34.8  31.5 - 35.7 g/dL Final    RDW 06/27/2023 12.0 (L)  12.3 - 15.4 % Final    RDW-SD 06/27/2023 41.4  37.0 - 54.0 fl Final    MPV 06/27/2023 10.5  6.0 - 12.0 fL Final    Platelets 06/27/2023 230  140 - 450 10*3/mm3 Final    Neutrophil % 06/27/2023 72.2  42.7 - 76.0 % Final    Lymphocyte % 06/27/2023 19.5 (L)  19.6 - 45.3 % Final    Monocyte % 06/27/2023 6.7  5.0 - 12.0 % Final    Eosinophil % 06/27/2023 0.6  0.3 - 6.2 % Final    Basophil % 06/27/2023 0.8  0.0 - 1.5 % Final    Immature Grans % 06/27/2023 0.2  0.0 - 0.5 % Final    Neutrophils, Absolute 06/27/2023 6.26  1.70 - 7.00 10*3/mm3 Final    Lymphocytes, Absolute 06/27/2023 1.69  0.70 - 3.10 10*3/mm3 Final    Monocytes, Absolute 06/27/2023 0.58  0.10 - 0.90 10*3/mm3 Final    Eosinophils, Absolute 06/27/2023 0.05  0.00 - 0.40 10*3/mm3 Final    Basophils, Absolute 06/27/2023 0.07  0.00 - 0.20 10*3/mm3 Final    Immature Grans, Absolute 06/27/2023 0.02  0.00 - 0.05 10*3/mm3 Final    nRBC 06/27/2023 0.0  0.0 - 0.2 /100 WBC Final         Assessment & Plan   Assessment     ICD-10-CM ICD-9-CM   1. Generalized anxiety disorder  F41.1 300.02         Continue Zoloft 50 mg daily.  Start Wellbutrin 75 mg twice daily x 4 weeks, then reevaluate efficacy.     GOALS:  Short Term Goals: Patient will be compliant with medication, and patient will have no significant medication related side effects.  Patient will be engaged in psychotherapy as indicated.  Patient will report subjective improvement of symptoms.  Long term goals: To stabilize mood and treat/improve subjective symptoms, the patient will stay out of the  hospital, the patient will be at an optimal level of functioning, and the patient will take all medications as prescribed.  The patient/guardian verbalized understanding and agreement with goals that were mutually set.      TREATMENT PLAN: Continue supportive psychotherapy efforts and medications as indicated.  Pharmacological and Non-Pharmacological treatment options discussed during today's visit. Patient/Guardian acknowledged and verbally consented with current treatment plan and was educated on the importance of compliance with treatment and follow-up appointments.      MEDICATION ISSUES:  Discussed medication options and treatment plan of prescribed medication as well as the risks, benefits, any black box warnings, and side effects including potential falls, possible impaired driving, and metabolic adversities among others. Patient is agreeable to call the office with any worsening of symptoms or onset of side effects, or if any concerns or questions arise.  The contact information for the office is made available to the patient. Patient is agreeable to call 911 or go to the nearest ER should they begin having any SI/HI, or if any urgent concerns arise. No medication side effects or related complaints today.     Medication Changes During Visit:   There are no discontinued medications.     No orders of the defined types were placed in this encounter.      Follow Up Appointment:   Return in about 3 months (around 1/4/2024) for Recheck.           This document has been electronically signed by JAMES Velázquez  October 4, 2023 12:59 EDT    Dictated Utilizing Dragon Dictation: Part of this note may be an electronic transcription/translation of spoken language to printed text using the Dragon Dictation System.

## 2023-10-19 ENCOUNTER — HOSPITAL ENCOUNTER (OUTPATIENT)
Dept: SLEEP MEDICINE | Facility: HOSPITAL | Age: 23
End: 2023-10-19
Payer: COMMERCIAL

## 2023-10-19 VITALS — WEIGHT: 164.9 LBS | BODY MASS INDEX: 21.16 KG/M2 | HEIGHT: 74 IN

## 2023-10-19 DIAGNOSIS — G47.19 EXCESSIVE DAYTIME SLEEPINESS: ICD-10-CM

## 2023-10-19 DIAGNOSIS — R29.818 SUSPECTED SLEEP APNEA: ICD-10-CM

## 2023-10-19 DIAGNOSIS — R51.9 MORNING HEADACHE: ICD-10-CM

## 2023-10-19 PROCEDURE — 95806 SLEEP STUDY UNATT&RESP EFFT: CPT

## 2023-10-24 ENCOUNTER — TELEPHONE (OUTPATIENT)
Dept: SLEEP MEDICINE | Facility: HOSPITAL | Age: 23
End: 2023-10-24
Payer: COMMERCIAL

## 2023-10-24 DIAGNOSIS — G47.33 OSA (OBSTRUCTIVE SLEEP APNEA): Primary | ICD-10-CM

## 2023-10-24 DIAGNOSIS — G47.19 EXCESSIVE DAYTIME SLEEPINESS: ICD-10-CM

## 2023-10-24 NOTE — TELEPHONE ENCOUNTER
PATIENT RETURNED CALL AFTER REVIEWING [facility] RE SLEEP STUDY RESULTS.      PATIENT IS AGREEABLE TO BEGIN PAP THERAPY.  NO PREFERENCE FOR DME/COMPLIANCE APPT NOT SCHEDULED.

## 2023-11-01 ENCOUNTER — TELEMEDICINE (OUTPATIENT)
Dept: PSYCHIATRY | Facility: CLINIC | Age: 23
End: 2023-11-01
Payer: COMMERCIAL

## 2023-11-01 DIAGNOSIS — F41.1 GENERALIZED ANXIETY DISORDER: Primary | ICD-10-CM

## 2023-11-01 RX ORDER — BUPROPION HYDROCHLORIDE 150 MG/1
150 TABLET ORAL EVERY MORNING
Qty: 30 TABLET | Refills: 2 | Status: SHIPPED | OUTPATIENT
Start: 2023-11-01 | End: 2024-01-30

## 2023-11-01 NOTE — PROGRESS NOTES
This provider is located at the [facility]al [facility] (through [facility]), [address], [facility], [address] using a secure [employer] Video Visit through [employer]. Patient is being seen remotely via telehealth at his work address in [address] [place] [address], and stated they are in a secure environment for this session. The patient's condition being diagnosed/treated is appropriate for telemedicine. The provider identified herself as well as her credentials.   The patient, and/or patients guardian, consent to be seen remotely, and when consent is given they understand that the consent allows for patient identifiable information to be sent to a third party as needed.   They may refuse to be seen remotely at any time. The electronic data is encrypted and password protected, and the patient and/or guardian has been advised of the potential risks to privacy not withstanding such measures.    You have chosen to receive care through a telehealth visit.  Do you consent to use a video/audio connection for your medical care today? Yes    Patient identifiers utilized: Name and date of birth.    Patient verbally confirmed consent for today's encounter:  November 1, 2023    Subjective   Walter Arora II is a 23 y.o. male who presents today for follow up    Chief Complaint:    Chief Complaint   Patient presents with    Anxiety    Med Management        History of Present Illness:    History of Present Illness  Walter is a 22 y/o  male seen in follow up for behavioral health services. He is being treated for MILANA and is on Zoloft 50 mg and Wellbutrin was added. Notes improvement in attention span, some of the day time fatigue, lack of motivation.     He has recently been diagnosed with sleep apnea, states its a neurological sleep apnea and is waiting to be fit for a CPAP machine. Patient educated sleep apnea can contribute to his complaints to this provider of headache,  mood disturbance, irritability, low energy, memory impairment/attention span. Patient educated on the importance of treating sleep apnea to assist in alleviating these symptoms. Patient verbalizes understanding.       Symptoms:  Sleep: hypersomnia  Appetite: eats well  Anxiety: improved, things he normally is fixated on are no longer bothering him    Original symptom burden:  Sleep: lays down 2230, wakes up at 0430 ready to go, and is not tired; this has been ongoing for 3-4 months  Appetite: eats well  Anxiety: obsessive worrying about certain things, feels less relaxed in certain situations, fixates on thoughts, heart races, mildly for one year, more prevalent x 2-3 months; states he has started a new job and worries it is not going well.        The following portions of the patient's history were reviewed and updated as appropriate: allergies, current medications, past family history, past medical history, past social history, past surgical history and problem list.    Past Psychiatric History:  Began Treatment: 2023  Diagnoses: no diagnosis prior to seeing this provider, diagnosed with MILANA by this provider  Psychiatrist:Denies  Therapist:Denies  Admission History:Denies  Medication Trials: Zoloft, Wellbutrin  Self Harm: Denies  Suicide Attempts:Denies   Psychosis, Anxiety, Depression:  not applicable    Past Medical History:  Past Medical History:   Diagnosis Date    Anxiety 23    Depression 23    Seasonal allergic rhinitis due to pollen 2022       Substance Abuse History:   Types: THC Delta 9 via vape until 10 days ago  Withdrawal Symptoms:Denies  Longest Period Sober:Not Applicable   AA: Not applicable     Social History:  Social History     Socioeconomic History    Marital status: Single   Tobacco Use    Smoking status: Never    Smokeless tobacco: Never   Vaping Use    Vaping Use: Never used   Substance and Sexual Activity    Alcohol use: Yes     Alcohol/week: 2.0 standard drinks  of alcohol     Types: 1 Glasses of wine, 1 Cans of beer per week     Comment: < 5 weekly    Drug use: Never    Sexual activity: Yes     Partners: Female     Birth control/protection: Condom, Pill       Family History:  Family History   Problem Relation Age of Onset    Alcohol abuse Maternal Grandfather     Anxiety disorder Maternal Grandfather     Depression Maternal Grandfather     Drug abuse Maternal Grandfather        Past Surgical History:  History reviewed. No pertinent surgical history.    Problem List:  Patient Active Problem List   Diagnosis    Seasonal allergic rhinitis due to pollen    MILANA (generalized anxiety disorder)    Major depressive disorder with single episode, in full remission       Allergy:   No Known Allergies     Current Medications:   Current Outpatient Medications   Medication Sig Dispense Refill    sertraline (Zoloft) 50 MG tablet Take 1 tablet by mouth Daily for 120 days. 30 tablet 3     No current facility-administered medications for this visit.       Review of Systems:    Review of Systems   Psychiatric/Behavioral: Negative.     All other systems reviewed and are negative.        Physical Exam:   Physical Exam  Constitutional:       General: He is awake.      Appearance: Normal appearance. He is well-developed, well-groomed and normal weight.   Neurological:      Mental Status: He is alert and oriented to person, place, and time.   Psychiatric:         Attention and Perception: Attention and perception normal.         Mood and Affect: Mood and affect normal.         Speech: Speech normal.         Behavior: Behavior normal. Behavior is cooperative.         Thought Content: Thought content normal.         Cognition and Memory: Cognition and memory normal.         Judgment: Judgment normal.         Vitals:  There were no vitals taken for this visit. There is no height or weight on file to calculate BMI.  Due to extenuating circumstances and possible current health risks associated with the  patient being present in a clinical setting (with current health restrictions in place in regards to possible COVID 19 transmission/exposure), the patient was seen remotely today via a MyChart Video Visit through [facility].  Unable to obtain vital signs due to nature of remote visit.  Height stated at 74 inches.  Weight stated at 164 pounds.    Last 3 Blood Pressure Readings:  BP Readings from Last 3 Encounters:   08/02/23 122/70   09/05/22 141/78   08/01/22 112/80       PHQ-9 Score:   PHQ-9 Total Score:      MILANA-7 Score:   Feeling nervous, anxious or on edge: (P) Not at all  Not being able to stop or control worrying: (P) Not at all  Worrying too much about different things: (P) Not at all  Trouble Relaxing: (P) Not at all  Being so restless that it is hard to sit still: (P) Not at all  Feeling afraid as if something awful might happen: (P) Not at all  Becoming easily annoyed or irritable: (P) Several days  MILANA 7 Total Score: (P) 1  If you checked any problems, how difficult have these problems made it for you to do your work, take care of things at home, or get along with other people: (P) Not difficult at all     Mental Status Exam:   Hygiene:   good  Cooperation:  Cooperative  Eye Contact:  Good  Psychomotor Behavior:  Appropriate  Affect:  Appropriate  Mood: normal  Hopelessness: Denies  Speech:  Normal  Thought Process:  Goal directed and Linear  Thought Content:  Mood congruent  Suicidal:  None  Homicidal:  None  Hallucinations:  None  Delusion:  None  Memory:  Intact  Orientation:  Person, Place, Time, and Situation  Reliability:  good  Insight:  Good  Judgement:  Good  Impulse Control:  Good  Physical/Medical Issues:  Yes sleep apnea        Lab Results:   Lab on 08/02/2023   Component Date Value Ref Range Status    HIV-1/ HIV-2 08/02/2023 Non-Reactive  Non-Reactive Final    A non-reactive test result does not preclude the possibility of exposure to HIV or infection with HIV. An antibody response to recent  exposure may take several months to reach detectable levels.    Chlamydia trachomatis, CARROL 08/02/2023 Negative  Negative Final    Neisseria gonorrhoeae, CARROL 08/02/2023 Negative  Negative Final    RPR 08/02/2023 Non-Reactive  Non-Reactive Final   Results Encounter on 06/13/2023   Component Date Value Ref Range Status    Glucose 06/27/2023 95  65 - 99 mg/dL Final    BUN 06/27/2023 19  6 - 20 mg/dL Final    Creatinine 06/27/2023 1.00  0.76 - 1.27 mg/dL Final    Sodium 06/27/2023 138  136 - 145 mmol/L Final    Potassium 06/27/2023 3.9  3.5 - 5.2 mmol/L Final    Chloride 06/27/2023 100  98 - 107 mmol/L Final    CO2 06/27/2023 23.8  22.0 - 29.0 mmol/L Final    Calcium 06/27/2023 9.2  8.6 - 10.5 mg/dL Final    Total Protein 06/27/2023 6.8  6.0 - 8.5 g/dL Final    Albumin 06/27/2023 4.6  3.5 - 5.2 g/dL Final    ALT (SGPT) 06/27/2023 32  1 - 41 U/L Final    AST (SGOT) 06/27/2023 29  1 - 40 U/L Final    Alkaline Phosphatase 06/27/2023 54  39 - 117 U/L Final    Total Bilirubin 06/27/2023 1.2  0.0 - 1.2 mg/dL Final    Globulin 06/27/2023 2.2  gm/dL Final    A/G Ratio 06/27/2023 2.1  g/dL Final    BUN/Creatinine Ratio 06/27/2023 19.0  7.0 - 25.0 Final    Anion Gap 06/27/2023 14.2  5.0 - 15.0 mmol/L Final    eGFR 06/27/2023 107.1  >60.0 mL/min/1.73 Final    Total Cholesterol 06/27/2023 159  0 - 200 mg/dL Final    Triglycerides 06/27/2023 42  0 - 150 mg/dL Final    HDL Cholesterol 06/27/2023 66 (H)  40 - 60 mg/dL Final    LDL Cholesterol  06/27/2023 84  0 - 100 mg/dL Final    VLDL Cholesterol 06/27/2023 9  5 - 40 mg/dL Final    LDL/HDL Ratio 06/27/2023 1.28   Final    Magnesium 06/27/2023 2.0  1.6 - 2.6 mg/dL Final    Vitamin B-12 06/27/2023 886  211 - 946 pg/mL Final    Methylmalonic Acid 06/27/2023 115  0 - 378 nmol/L Final    TSH 06/27/2023 1.170  0.270 - 4.200 uIU/mL Final    Free T4 06/27/2023 1.50  0.93 - 1.70 ng/dL Final    WBC 06/27/2023 8.67  3.40 - 10.80 10*3/mm3 Final    RBC 06/27/2023 4.94  4.14 - 5.80 10*6/mm3 Final     Hemoglobin 06/27/2023 16.1  13.0 - 17.7 g/dL Final    Hematocrit 06/27/2023 46.2  37.5 - 51.0 % Final    MCV 06/27/2023 93.5  79.0 - 97.0 fL Final    MCH 06/27/2023 32.6  26.6 - 33.0 pg Final    MCHC 06/27/2023 34.8  31.5 - 35.7 g/dL Final    RDW 06/27/2023 12.0 (L)  12.3 - 15.4 % Final    RDW-SD 06/27/2023 41.4  37.0 - 54.0 fl Final    MPV 06/27/2023 10.5  6.0 - 12.0 fL Final    Platelets 06/27/2023 230  140 - 450 10*3/mm3 Final    Neutrophil % 06/27/2023 72.2  42.7 - 76.0 % Final    Lymphocyte % 06/27/2023 19.5 (L)  19.6 - 45.3 % Final    Monocyte % 06/27/2023 6.7  5.0 - 12.0 % Final    Eosinophil % 06/27/2023 0.6  0.3 - 6.2 % Final    Basophil % 06/27/2023 0.8  0.0 - 1.5 % Final    Immature Grans % 06/27/2023 0.2  0.0 - 0.5 % Final    Neutrophils, Absolute 06/27/2023 6.26  1.70 - 7.00 10*3/mm3 Final    Lymphocytes, Absolute 06/27/2023 1.69  0.70 - 3.10 10*3/mm3 Final    Monocytes, Absolute 06/27/2023 0.58  0.10 - 0.90 10*3/mm3 Final    Eosinophils, Absolute 06/27/2023 0.05  0.00 - 0.40 10*3/mm3 Final    Basophils, Absolute 06/27/2023 0.07  0.00 - 0.20 10*3/mm3 Final    Immature Grans, Absolute 06/27/2023 0.02  0.00 - 0.05 10*3/mm3 Final    nRBC 06/27/2023 0.0  0.0 - 0.2 /100 WBC Final         Assessment & Plan   Assessment     ICD-10-CM ICD-9-CM   1. Generalized anxiety disorder  F41.1 300.02           Continue Zoloft 50 mg daily.  Change formulation to Wellbutrin  mg daily.    GOALS:  Short Term Goals: Patient will be compliant with medication, and patient will have no significant medication related side effects.  Patient will be engaged in psychotherapy as indicated.  Patient will report subjective improvement of symptoms.  Long term goals: To stabilize mood and treat/improve subjective symptoms, the patient will stay out of the hospital, the patient will be at an optimal level of functioning, and the patient will take all medications as prescribed.  The patient/guardian verbalized understanding and  agreement with goals that were mutually set.      TREATMENT PLAN: Continue supportive psychotherapy efforts and medications as indicated.  Pharmacological and Non-Pharmacological treatment options discussed during today's visit. Patient/Guardian acknowledged and verbally consented with current treatment plan and was educated on the importance of compliance with treatment and follow-up appointments.      MEDICATION ISSUES:  Discussed medication options and treatment plan of prescribed medication as well as the risks, benefits, any black box warnings, and side effects including potential falls, possible impaired driving, and metabolic adversities among others. Patient is agreeable to call the office with any worsening of symptoms or onset of side effects, or if any concerns or questions arise.  The contact information for the office is made available to the patient. Patient is agreeable to call 911 or go to the nearest ER should they begin having any SI/HI, or if any urgent concerns arise. No medication side effects or related complaints today.     Medication Changes During Visit:   Medications Discontinued During This Encounter   Medication Reason    buPROPion (WELLBUTRIN) 75 MG tablet Dose adjustment        No orders of the defined types were placed in this encounter.      Follow Up Appointment:   Return in about 2 months (around 1/1/2024) for Recheck.           This document has been electronically signed by JAMES Velázquez  November 1, 2023 08:42 EDT    Dictated Utilizing Dragon Dictation: Part of this note may be an electronic transcription/translation of spoken language to printed text using the Dragon Dictation System.

## 2023-12-12 DIAGNOSIS — F41.1 GENERALIZED ANXIETY DISORDER: ICD-10-CM

## 2023-12-27 ENCOUNTER — TELEMEDICINE (OUTPATIENT)
Dept: PSYCHIATRY | Facility: CLINIC | Age: 23
End: 2023-12-27
Payer: COMMERCIAL

## 2023-12-27 DIAGNOSIS — F41.1 GENERALIZED ANXIETY DISORDER: ICD-10-CM

## 2023-12-27 RX ORDER — BUPROPION HYDROCHLORIDE 150 MG/1
150 TABLET ORAL EVERY MORNING
Qty: 90 TABLET | Refills: 0 | Status: SHIPPED | OUTPATIENT
Start: 2023-12-27 | End: 2024-03-26

## 2023-12-27 NOTE — PROGRESS NOTES
This provider is located at the [facility] (through [facility]), [address], [facility], [address] using a secure [employer] Video Visit through [employer]. Patient is being seen remotely via telehealth at his work address in [address] [place] [address], and stated they are in a secure environment for this session. The patient's condition being diagnosed/treated is appropriate for telemedicine. The provider identified herself as well as her credentials.   The patient, and/or patients guardian, consent to be seen remotely, and when consent is given they understand that the consent allows for patient identifiable information to be sent to a third party as needed.   They may refuse to be seen remotely at any time. The electronic data is encrypted and password protected, and the patient and/or guardian has been advised of the potential risks to privacy not withstanding such measures.    You have chosen to receive care through a telehealth visit.  Do you consent to use a video/audio connection for your medical care today? Yes    Patient identifiers utilized: Name and date of birth.    Patient verbally confirmed consent for today's encounter:  December 27, 2023    Subjective   Walter Arora II is a 23 y.o. male who presents today for follow up    Chief Complaint:    Chief Complaint   Patient presents with    Anxiety    Med Management        History of Present Illness:    History of Present Illness  Walter is a 24 y/o  male seen in follow up for behavioral health services. He is being treated for MILANA and is on Zoloft 50 mg and Wellbutrin  mg. Continues to note control of anxiety as well as improvement in attention span, day time fatigue, lack of motivation. He continues to use a CPAP machine to treat neurological sleep apnea. Patient educated sleep apnea can contribute to his complaints to this provider of headache, mood disturbance, irritability, low  energy, memory impairment/attention span. Patient educated on the importance of treating sleep apnea to assist in alleviating these symptoms. Patient verbalizes understanding. Patient states he can tell a tremendous difference overall in his symptom burden. States he is sleeping and eating well.     Symptoms:  Sleep: hypersomnia  Appetite: eats well  Anxiety: improved, things he normally is fixated on are no longer bothering him    Original symptom burden:  Sleep: lays down 2230, wakes up at 0430 ready to go, and is not tired; this has been ongoing for 3-4 months  Appetite: eats well  Anxiety: obsessive worrying about certain things, feels less relaxed in certain situations, fixates on thoughts, heart races, mildly for one year, more prevalent x 2-3 months; states he has started a new job and worries it is not going well.        The following portions of the patient's history were reviewed and updated as appropriate: allergies, current medications, past family history, past medical history, past social history, past surgical history and problem list.    Past Psychiatric History:  Began Treatment: 2023  Diagnoses: no diagnosis prior to seeing this provider, diagnosed with MILANA by this provider  Psychiatrist:Denies  Therapist:Denies  Admission History:Denies  Medication Trials: Zoloft, Wellbutrin  Self Harm: Denies  Suicide Attempts:Denies   Psychosis, Anxiety, Depression:  not applicable    Past Medical History:  Past Medical History:   Diagnosis Date    Anxiety 23    Depression 23    Seasonal allergic rhinitis due to pollen 2022       Substance Abuse History:   Types: THC Delta 9 via vape until 10 days ago  Withdrawal Symptoms:Denies  Longest Period Sober:Not Applicable   AA: Not applicable     Social History:  Social History     Socioeconomic History    Marital status: Single   Tobacco Use    Smoking status: Never    Smokeless tobacco: Never   Vaping Use    Vaping Use: Never used    Substance and Sexual Activity    Alcohol use: Yes     Alcohol/week: 2.0 standard drinks of alcohol     Types: 1 Glasses of wine, 1 Cans of beer per week     Comment: < 5 weekly    Drug use: Never    Sexual activity: Yes     Partners: Female     Birth control/protection: Condom, Pill       Family History:  Family History   Problem Relation Age of Onset    Alcohol abuse Maternal Grandfather     Anxiety disorder Maternal Grandfather     Depression Maternal Grandfather     Drug abuse Maternal Grandfather        Past Surgical History:  History reviewed. No pertinent surgical history.    Problem List:  Patient Active Problem List   Diagnosis    Seasonal allergic rhinitis due to pollen    MILANA (generalized anxiety disorder)    Major depressive disorder with single episode, in full remission       Allergy:   No Known Allergies     Current Medications:   Current Outpatient Medications   Medication Sig Dispense Refill    buPROPion XL (Wellbutrin XL) 150 MG 24 hr tablet Take 1 tablet by mouth Every Morning for 90 days. 90 tablet 0    sertraline (ZOLOFT) 50 MG tablet Take 1 tablet by mouth Daily for 90 days. 90 tablet 0     No current facility-administered medications for this visit.       Review of Systems:    Review of Systems   Psychiatric/Behavioral: Negative.     All other systems reviewed and are negative.        Physical Exam:   Physical Exam  Constitutional:       General: He is awake.      Appearance: Normal appearance. He is well-developed, well-groomed and normal weight.   Neurological:      Mental Status: He is alert and oriented to person, place, and time.   Psychiatric:         Attention and Perception: Attention and perception normal.         Mood and Affect: Mood and affect normal.         Speech: Speech normal.         Behavior: Behavior normal. Behavior is cooperative.         Thought Content: Thought content normal.         Cognition and Memory: Cognition and memory normal.         Judgment: Judgment normal.          Vitals:  There were no vitals taken for this visit. There is no height or weight on file to calculate BMI.  Due to extenuating circumstances and possible current health risks associated with the patient being present in a clinical setting (with current health restrictions in place in regards to possible COVID 19 transmission/exposure), the patient was seen remotely today via a MyChart Video Visit through [facility].  Unable to obtain vital signs due to nature of remote visit.  Height stated at 74 inches.  Weight stated at 164 pounds.    Last 3 Blood Pressure Readings:  BP Readings from Last 3 Encounters:   08/02/23 122/70   09/05/22 141/78   08/01/22 112/80       PHQ-9 Score:   PHQ-9 Total Score:      MILANA-7 Score:         Mental Status Exam:   Hygiene:   good  Cooperation:  Cooperative  Eye Contact:  Good  Psychomotor Behavior:  Appropriate  Affect:  Appropriate  Mood: normal  Hopelessness: Denies  Speech:  Normal  Thought Process:  Goal directed and Linear  Thought Content:  Mood congruent  Suicidal:  None  Homicidal:  None  Hallucinations:  None  Delusion:  None  Memory:  Intact  Orientation:  Person, Place, Time, and Situation  Reliability:  good  Insight:  Good  Judgement:  Good  Impulse Control:  Good  Physical/Medical Issues:  Yes neurological sleep apnea        Lab Results:   Lab on 08/02/2023   Component Date Value Ref Range Status    HIV-1/ HIV-2 08/02/2023 Non-Reactive  Non-Reactive Final    A non-reactive test result does not preclude the possibility of exposure to HIV or infection with HIV. An antibody response to recent exposure may take several months to reach detectable levels.    Chlamydia trachomatis, CARROL 08/02/2023 Negative  Negative Final    Neisseria gonorrhoeae, CARROL 08/02/2023 Negative  Negative Final    RPR 08/02/2023 Non-Reactive  Non-Reactive Final         Assessment & Plan   Assessment     ICD-10-CM ICD-9-CM   1. Generalized anxiety disorder  F41.1 300.02             Continue Zoloft 50 mg  daily.  Continue Wellbutrin  mg daily.    GOALS:  Short Term Goals: Patient will be compliant with medication, and patient will have no significant medication related side effects.  Patient will be engaged in psychotherapy as indicated.  Patient will report subjective improvement of symptoms.  Long term goals: To stabilize mood and treat/improve subjective symptoms, the patient will stay out of the hospital, the patient will be at an optimal level of functioning, and the patient will take all medications as prescribed.  The patient/guardian verbalized understanding and agreement with goals that were mutually set.      TREATMENT PLAN: Continue supportive psychotherapy efforts and medications as indicated.  Pharmacological and Non-Pharmacological treatment options discussed during today's visit. Patient/Guardian acknowledged and verbally consented with current treatment plan and was educated on the importance of compliance with treatment and follow-up appointments.      MEDICATION ISSUES:  Discussed medication options and treatment plan of prescribed medication as well as the risks, benefits, any black box warnings, and side effects including potential falls, possible impaired driving, and metabolic adversities among others. Patient is agreeable to call the office with any worsening of symptoms or onset of side effects, or if any concerns or questions arise.  The contact information for the office is made available to the patient. Patient is agreeable to call 911 or go to the nearest ER should they begin having any SI/HI, or if any urgent concerns arise. No medication side effects or related complaints today.     Medication Changes During Visit:   Medications Discontinued During This Encounter   Medication Reason    buPROPion XL (Wellbutrin XL) 150 MG 24 hr tablet Reorder    sertraline (ZOLOFT) 50 MG tablet Reorder          New Medications Ordered This Visit   Medications    sertraline (ZOLOFT) 50 MG tablet      Sig: Take 1 tablet by mouth Daily for 90 days.     Dispense:  90 tablet     Refill:  0    buPROPion XL (Wellbutrin XL) 150 MG 24 hr tablet     Sig: Take 1 tablet by mouth Every Morning for 90 days.     Dispense:  90 tablet     Refill:  0       Follow Up Appointment:   Return in about 3 months (around 3/27/2024) for Recheck.           This document has been electronically signed by JAMES Velázquez  December 27, 2023 08:41 EST    Dictated Utilizing Dragon Dictation: Part of this note may be an electronic transcription/translation of spoken language to printed text using the Dragon Dictation System.

## 2024-01-12 ENCOUNTER — TELEMEDICINE (OUTPATIENT)
Dept: SLEEP MEDICINE | Facility: CLINIC | Age: 24
End: 2024-01-12
Payer: COMMERCIAL

## 2024-01-12 VITALS — HEIGHT: 75 IN | WEIGHT: 165 LBS | BODY MASS INDEX: 20.51 KG/M2

## 2024-01-12 DIAGNOSIS — G47.33 OSA (OBSTRUCTIVE SLEEP APNEA): Primary | ICD-10-CM

## 2024-01-12 PROCEDURE — 99213 OFFICE O/P EST LOW 20 MIN: CPT | Performed by: NURSE PRACTITIONER

## 2024-01-12 NOTE — PROGRESS NOTES
Chief Complaint:   Chief Complaint   Patient presents with    Follow-up         Walter Arora II is a 23 y.o. male here for follow-up of sleep apnea.          Patient was seen in consult 9/1/2023 for witnessed apneas, gasping, and morning headaches.  Patient has sleep study 10/19/2023 that showed mild obstructive sleep apnea.  Patient did initiate CPAP therapy.  Patient states in the beginning this was a struggle to use but does seem to be getting better.  He does get 9 hours of sleep nightly and goes to sleep within minutes.  He does not get up during the night and puts his Mora score at 6/24.  We did speak today about 55 of the last 60 days of use with a greater than 4-hour use of 50%.  Patient states he sometimes awakens in the mask is in the floor without him knowing.  I did ask him to please wear this on the weekends at times when awake to desensitize his body as recognizing this as a foreign object.  Patient states he would like to use this idea.  Patient is eager to be compliant he will continue therapy.  Medications are:   Current Outpatient Medications:     buPROPion XL (Wellbutrin XL) 150 MG 24 hr tablet, Take 1 tablet by mouth Every Morning for 90 days., Disp: 90 tablet, Rfl: 0    sertraline (ZOLOFT) 50 MG tablet, Take 1 tablet by mouth Daily for 90 days., Disp: 90 tablet, Rfl: 0.      The patient's relevant past medical, surgical, family and social history were reviewed and updated in Epic as appropriate.       Review of Systems   HENT:  Positive for rhinorrhea.    Respiratory:  Positive for apnea.    Allergic/Immunologic: Positive for environmental allergies.   Neurological:  Positive for headaches.   Psychiatric/Behavioral:  Positive for dysphoric mood and sleep disturbance. The patient is nervous/anxious.    All other systems reviewed and are negative.        Objective:    Physical Exam  Constitutional:       Appearance: Normal appearance.   HENT:      Head: Normocephalic and atraumatic.    Pulmonary:      Effort: Pulmonary effort is normal. No respiratory distress.   Neurological:      Mental Status: He is alert and oriented to person, place, and time.   Psychiatric:         Mood and Affect: Mood normal.         Behavior: Behavior normal.         Thought Content: Thought content normal.         Judgment: Judgment normal.         CPAP Report    55/60 days of use  Greater than 4-hour use 50%  Settings 8-12  95th percentile pressure 10.3  AHI 2.0  The patient continues to use and benefit from CPAP therapy.    ASSESSMENT/PLAN    Diagnoses and all orders for this visit:    1. VARINDER (obstructive sleep apnea) (Primary)  -     PAP Therapy        Counseled patient regarding multimodal approach with healthy nutrition, healthy sleep, regular physical activity, social activities, counseling, and medications. Encouraged to practice lateral sleep position. Avoid alcohol and sedatives close to bedtime.  Refill supplies x 1 year.  Return to clinic 1 year or sooner as symptoms warrant.  Patient will let me know should he have any more issues.    The patient is located in [facility]. The patient presents today for telehealth service.  This service was conducted via audio/video technology through a secure [employer] video visit connection through Epic.  This provider is located in [facility].  Patient stated they are in a secure environment for the session.  Patient's condition being diagnosed/treated is appropriate for telemedicine.  The provider identified himself as well as his credentials.  The patient, and/or patient's guardian, consent to be seen remotely, and when consent is given they understanding that the consent allows for patient identifiable information to be sent to a third-party as needed.  They may refuse to be seen remotely at any time.  The electronic data is encrypted and password protected, and the patient and/or guardian has been advised of the potential risk to privacy not  withstanding such measures.  Patient identifiers used: Name and date of birth.   I have reviewed the results of my evaluation and impression and discussed my recommendations in detail with the patient.      Signed by  JAMES Uribe    January 12, 2024      CC: Jean Claude Bennett MD         No ref. provider found

## 2024-03-27 ENCOUNTER — TELEMEDICINE (OUTPATIENT)
Dept: PSYCHIATRY | Facility: CLINIC | Age: 24
End: 2024-03-27
Payer: COMMERCIAL

## 2024-03-27 VITALS — BODY MASS INDEX: 21.53 KG/M2 | WEIGHT: 170 LBS

## 2024-03-27 DIAGNOSIS — F41.1 GENERALIZED ANXIETY DISORDER: Primary | ICD-10-CM

## 2024-03-27 RX ORDER — BUPROPION HYDROCHLORIDE 75 MG/1
TABLET ORAL
Qty: 10 TABLET | Refills: 0 | Status: SHIPPED | OUTPATIENT
Start: 2024-03-27 | End: 2024-04-10

## 2024-03-27 NOTE — PROGRESS NOTES
This provider is located at the [facility] (through [facility]), [address], [facility], [address] using a secure [employer] Video Visit through [employer]. Patient is being seen remotely via telehealth at his work address in [address] [place] [address], and stated they are in a secure environment for this session. The patient's condition being diagnosed/treated is appropriate for telemedicine. The provider identified herself as well as her credentials.   The patient, and/or patients guardian, consent to be seen remotely, and when consent is given they understand that the consent allows for patient identifiable information to be sent to a third party as needed.   They may refuse to be seen remotely at any time. The electronic data is encrypted and password protected, and the patient and/or guardian has been advised of the potential risks to privacy not withstanding such measures.    You have chosen to receive care through a telehealth visit.  Do you consent to use a video/audio connection for your medical care today? Yes    Patient identifiers utilized: Name and date of birth.    Patient verbally confirmed consent for today's encounter:  March 27, 2024    Subjective   Walter Arora II is a 24 y.o. male who presents today for follow up    Chief Complaint:    Chief Complaint   Patient presents with    Anxiety    Med Management        History of Present Illness:    History of Present Illness  Walter is a 23 y/o  male seen in follow up for behavioral health services. He is being treated for MILANA and reports he has tapered himself off Zoloft but is currently still taking Wellbutrin  mg. States he feels he is stable and would like to try and taper off Wellbutrin as well. He is educated on potential rebound symptoms including return of his original complaints of headache, mood disturbance, irritability, low energy, memory impairment/attention span. He  verbalizes understanding. States he is sleeping and eating well.           The following portions of the patient's history were reviewed and updated as appropriate: allergies, current medications, past family history, past medical history, past social history, past surgical history and problem list.    Past Psychiatric History:  Began Treatment: 2023    Diagnoses: no diagnosis prior to seeing this provider, diagnosed with MILANA by this provider    Psychiatrist:Denies    Therapist:Denies    Admission History:Denies    Medication Trials: Zoloft, Wellbutrin    Self Harm: Denies    Suicide Attempts:Denies     Psychosis, Anxiety, Depression:  not applicable    Past Medical History:  Past Medical History:   Diagnosis Date    Anxiety 23    Depression 23    Seasonal allergic rhinitis due to pollen 2022       Substance Abuse History:   Types: THC [employer] 9 via vape until 10 days ago  Withdrawal Symptoms:Denies  Longest Period Sober:Not Applicable   AA: Not applicable     Social History:  Social History     Socioeconomic History    Marital status: Single   Tobacco Use    Smoking status: Never    Smokeless tobacco: Never   Vaping Use    Vaping status: Never Used   Substance and Sexual Activity    Alcohol use: Yes     Alcohol/week: 2.0 standard drinks of alcohol     Types: 1 Glasses of wine, 1 Cans of beer per week     Comment: < 5 weekly    Drug use: Never    Sexual activity: Yes     Partners: Female     Birth control/protection: Condom, Pill       Family History:  Family History   Problem Relation Age of Onset    Alcohol abuse Maternal Grandfather     Anxiety disorder Maternal Grandfather     Depression Maternal Grandfather     Drug abuse Maternal Grandfather        Past Surgical History:  History reviewed. No pertinent surgical history.    Problem List:  Patient Active Problem List   Diagnosis    Seasonal allergic rhinitis due to pollen    MILANA (generalized anxiety disorder)    Major depressive  disorder with single episode, in full remission       Allergy:   No Known Allergies     Current Medications:   Current Outpatient Medications   Medication Sig Dispense Refill    buPROPion XL (Wellbutrin XL) 150 MG 24 hr tablet Take 1 tablet by mouth Every Morning for 90 days. 90 tablet 0    sertraline (ZOLOFT) 50 MG tablet Take 1 tablet by mouth Daily for 90 days. 90 tablet 0     No current facility-administered medications for this visit.       Review of Systems:    Review of Systems   Psychiatric/Behavioral: Negative.     All other systems reviewed and are negative.        Physical Exam:   Physical Exam  Constitutional:       General: He is awake.      Appearance: Normal appearance. He is well-developed, well-groomed and normal weight.   Neurological:      Mental Status: He is alert and oriented to person, place, and time.   Psychiatric:         Attention and Perception: Attention and perception normal.         Mood and Affect: Mood and affect normal.         Speech: Speech normal.         Behavior: Behavior normal. Behavior is cooperative.         Thought Content: Thought content normal.         Cognition and Memory: Cognition and memory normal.         Judgment: Judgment normal.         Vitals:  There were no vitals taken for this visit. There is no height or weight on file to calculate BMI.  Due to extenuating circumstances and possible current health risks associated with the patient being present in a clinical setting (with current health restrictions in place in regards to possible COVID 19 transmission/exposure), the patient was seen remotely today via a MyChart Video Visit through [employer].  Unable to obtain vital signs due to nature of remote visit.  Height stated at 74 inches.  Weight stated at 170 pounds.    Last 3 Blood Pressure Readings:  BP Readings from Last 3 Encounters:   08/02/23 122/70   09/05/22 141/78   08/01/22 112/80       PHQ-9 Score:   PHQ-9 Total Score:      MILANA-7 Score:   Feeling nervous,  anxious or on edge: (P) Not at all  Not being able to stop or control worrying: (P) Not at all  Worrying too much about different things: (P) Not at all  Trouble Relaxing: (P) Not at all  Being so restless that it is hard to sit still: (P) Not at all  Feeling afraid as if something awful might happen: (P) Not at all  Becoming easily annoyed or irritable: (P) Not at all  MILANA 7 Total Score: (P) 0  If you checked any problems, how difficult have these problems made it for you to do your work, take care of things at home, or get along with other people: (P) Not difficult at all     Mental Status Exam:   Hygiene:   good  Cooperation:  Cooperative  Eye Contact:  Good  Psychomotor Behavior:  Appropriate  Affect:  Appropriate  Mood: normal  Hopelessness: Denies  Speech:  Normal  Thought Process:  Goal directed and Linear  Thought Content:  Mood congruent  Suicidal:  None  Homicidal:  None  Hallucinations:  None  Delusion:  None  Memory:  Intact  Orientation:  Person, Place, Time, and Situation  Reliability:  good  Insight:  Good  Judgement:  Good  Impulse Control:  Good  Physical/Medical Issues:  Yes neurological sleep apnea        Lab Results:   No visits with results within 6 Month(s) from this visit.   Latest known visit with results is:   Lab on 08/02/2023   Component Date Value Ref Range Status    HIV-1/ HIV-2 08/02/2023 Non-Reactive  Non-Reactive Final    A non-reactive test result does not preclude the possibility of exposure to HIV or infection with HIV. An antibody response to recent exposure may take several months to reach detectable levels.    Chlamydia trachomatis, CARROL 08/02/2023 Negative  Negative Final    Neisseria gonorrhoeae, CARROL 08/02/2023 Negative  Negative Final    RPR 08/02/2023 Non-Reactive  Non-Reactive Final         Assessment & Plan   Assessment     ICD-10-CM ICD-9-CM   1. Generalized anxiety disorder  F41.1 300.02          Patient has self tapered off Zoloft.     Patient wishes to taper off  Wellbutrin. He is instructed to take Wellbutrin 75 mg daily x 7 days, then 75 mg every other day x 7 days then stop.     GOALS:  Short Term Goals: Patient will be compliant with medication, and patient will have no significant medication related side effects.  Patient will be engaged in psychotherapy as indicated.  Patient will report subjective improvement of symptoms.  Long term goals: To stabilize mood and treat/improve subjective symptoms, the patient will stay out of the hospital, the patient will be at an optimal level of functioning, and the patient will take all medications as prescribed.  The patient/guardian verbalized understanding and agreement with goals that were mutually set.      TREATMENT PLAN: Continue supportive psychotherapy efforts and medications as indicated.  Pharmacological and Non-Pharmacological treatment options discussed during today's visit. Patient/Guardian acknowledged and verbally consented with current treatment plan and was educated on the importance of compliance with treatment and follow-up appointments.      MEDICATION ISSUES:  Discussed medication options and treatment plan of prescribed medication as well as the risks, benefits, any black box warnings, and side effects including potential falls, possible impaired driving, and metabolic adversities among others. Patient is agreeable to call the office with any worsening of symptoms or onset of side effects, or if any concerns or questions arise.  The contact information for the office is made available to the patient. Patient is agreeable to call 911 or go to the nearest ER should they begin having any SI/HI, or if any urgent concerns arise. No medication side effects or related complaints today.     Medication Changes During Visit:   There are no discontinued medications.         No orders of the defined types were placed in this encounter.      Follow Up Appointment:   Return in about 3 months (around 6/27/2024) for  Recheck.           This document has been electronically signed by JAMES Velázquez  March 27, 2024 08:32 EDT    Dictated Utilizing Dragon Dictation: Part of this note may be an electronic transcription/translation of spoken language to printed text using the Dragon Dictation System.

## 2024-08-05 ENCOUNTER — LAB (OUTPATIENT)
Dept: LAB | Facility: HOSPITAL | Age: 24
End: 2024-08-05
Payer: COMMERCIAL

## 2024-08-05 ENCOUNTER — OFFICE VISIT (OUTPATIENT)
Dept: INTERNAL MEDICINE | Facility: CLINIC | Age: 24
End: 2024-08-05
Payer: COMMERCIAL

## 2024-08-05 VITALS
BODY MASS INDEX: 23.41 KG/M2 | HEIGHT: 74 IN | DIASTOLIC BLOOD PRESSURE: 64 MMHG | HEART RATE: 72 BPM | TEMPERATURE: 98 F | WEIGHT: 182.4 LBS | SYSTOLIC BLOOD PRESSURE: 134 MMHG

## 2024-08-05 DIAGNOSIS — Z13.220 LIPID SCREENING: ICD-10-CM

## 2024-08-05 DIAGNOSIS — G47.33 OSA (OBSTRUCTIVE SLEEP APNEA): Chronic | ICD-10-CM

## 2024-08-05 DIAGNOSIS — R53.83 FATIGUE, UNSPECIFIED TYPE: ICD-10-CM

## 2024-08-05 DIAGNOSIS — N52.9 ERECTILE DYSFUNCTION, UNSPECIFIED ERECTILE DYSFUNCTION TYPE: ICD-10-CM

## 2024-08-05 DIAGNOSIS — J30.1 SEASONAL ALLERGIC RHINITIS DUE TO POLLEN: Chronic | ICD-10-CM

## 2024-08-05 DIAGNOSIS — B00.1 RECURRENT COLD SORES: ICD-10-CM

## 2024-08-05 DIAGNOSIS — F41.1 GAD (GENERALIZED ANXIETY DISORDER): Chronic | ICD-10-CM

## 2024-08-05 DIAGNOSIS — R68.82 DECREASED LIBIDO: ICD-10-CM

## 2024-08-05 DIAGNOSIS — J34.89 NASAL OBSTRUCTION: ICD-10-CM

## 2024-08-05 DIAGNOSIS — Z00.00 WELL ADULT EXAM: Primary | ICD-10-CM

## 2024-08-05 LAB
ALBUMIN SERPL-MCNC: 4.8 G/DL (ref 3.5–5.2)
ALBUMIN/GLOB SERPL: 2.2 G/DL
ALP SERPL-CCNC: 55 U/L (ref 39–117)
ALT SERPL W P-5'-P-CCNC: 19 U/L (ref 1–41)
ANION GAP SERPL CALCULATED.3IONS-SCNC: 11 MMOL/L (ref 5–15)
AST SERPL-CCNC: 26 U/L (ref 1–40)
BILIRUB SERPL-MCNC: 0.5 MG/DL (ref 0–1.2)
BUN SERPL-MCNC: 16 MG/DL (ref 6–20)
BUN/CREAT SERPL: 15.1 (ref 7–25)
CALCIUM SPEC-SCNC: 9.1 MG/DL (ref 8.6–10.5)
CHLORIDE SERPL-SCNC: 103 MMOL/L (ref 98–107)
CHOLEST SERPL-MCNC: 150 MG/DL (ref 0–200)
CO2 SERPL-SCNC: 27 MMOL/L (ref 22–29)
CREAT SERPL-MCNC: 1.06 MG/DL (ref 0.76–1.27)
DEPRECATED RDW RBC AUTO: 37.6 FL (ref 37–54)
EGFRCR SERPLBLD CKD-EPI 2021: 99.3 ML/MIN/1.73
ERYTHROCYTE [DISTWIDTH] IN BLOOD BY AUTOMATED COUNT: 11.1 % (ref 12.3–15.4)
GLOBULIN UR ELPH-MCNC: 2.2 GM/DL
GLUCOSE SERPL-MCNC: 93 MG/DL (ref 65–99)
HCT VFR BLD AUTO: 45.6 % (ref 37.5–51)
HDLC SERPL-MCNC: 60 MG/DL (ref 40–60)
HGB BLD-MCNC: 15.7 G/DL (ref 13–17.7)
LDLC SERPL CALC-MCNC: 80 MG/DL (ref 0–100)
LDLC/HDLC SERPL: 1.34 {RATIO}
MCH RBC QN AUTO: 31.7 PG (ref 26.6–33)
MCHC RBC AUTO-ENTMCNC: 34.4 G/DL (ref 31.5–35.7)
MCV RBC AUTO: 92.1 FL (ref 79–97)
PLATELET # BLD AUTO: 205 10*3/MM3 (ref 140–450)
PMV BLD AUTO: 11.3 FL (ref 6–12)
POTASSIUM SERPL-SCNC: 4.4 MMOL/L (ref 3.5–5.2)
PROLACTIN SERPL-MCNC: 7.78 NG/ML (ref 4.04–15.2)
PROT SERPL-MCNC: 7 G/DL (ref 6–8.5)
RBC # BLD AUTO: 4.95 10*6/MM3 (ref 4.14–5.8)
SODIUM SERPL-SCNC: 141 MMOL/L (ref 136–145)
TESTOST SERPL-MCNC: 417 NG/DL (ref 249–836)
TRIGL SERPL-MCNC: 48 MG/DL (ref 0–150)
TSH SERPL DL<=0.05 MIU/L-ACNC: 2.06 UIU/ML (ref 0.27–4.2)
VIT B12 BLD-MCNC: 925 PG/ML (ref 211–946)
VLDLC SERPL-MCNC: 10 MG/DL (ref 5–40)
WBC NRBC COR # BLD AUTO: 5.77 10*3/MM3 (ref 3.4–10.8)

## 2024-08-05 PROCEDURE — 84146 ASSAY OF PROLACTIN: CPT

## 2024-08-05 PROCEDURE — 99395 PREV VISIT EST AGE 18-39: CPT | Performed by: STUDENT IN AN ORGANIZED HEALTH CARE EDUCATION/TRAINING PROGRAM

## 2024-08-05 PROCEDURE — 80061 LIPID PANEL: CPT

## 2024-08-05 PROCEDURE — 99214 OFFICE O/P EST MOD 30 MIN: CPT | Performed by: STUDENT IN AN ORGANIZED HEALTH CARE EDUCATION/TRAINING PROGRAM

## 2024-08-05 PROCEDURE — 84403 ASSAY OF TOTAL TESTOSTERONE: CPT

## 2024-08-05 PROCEDURE — 86696 HERPES SIMPLEX TYPE 2 TEST: CPT

## 2024-08-05 PROCEDURE — 86695 HERPES SIMPLEX TYPE 1 TEST: CPT

## 2024-08-05 PROCEDURE — 82607 VITAMIN B-12: CPT

## 2024-08-05 PROCEDURE — 80050 GENERAL HEALTH PANEL: CPT

## 2024-08-05 RX ORDER — VALACYCLOVIR HYDROCHLORIDE 500 MG/1
500 TABLET, FILM COATED ORAL 2 TIMES DAILY
Qty: 6 TABLET | Refills: 3 | Status: SHIPPED | OUTPATIENT
Start: 2024-08-05

## 2024-08-05 NOTE — PROGRESS NOTES
Chief Complaint  Walter Arora II is a 24 y.o. male presenting for Annual Exam.     From [place]. 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 Has bachelor and master in finance. Went to [email or web address] for grad school. Single. No children.     Patient has a past medical history of anxiety/MILANA, single MDD (2023), recurrent cold sores, seasonal rhinitis and VARINDER (didn't tolerate CPAP).    History of Present Illness  Patient is here for annual physical and annual follow-up with additional questions.    He has been following with psychiatry/behavioral health, he has been tapered off Wellbutrin and feels he is now doing well off medications.    He also saw sleep medicine, he did trial of CPAP, but did not tolerate so well.  He was noted with fairly mild sleep apnea.  For now he wants to continue with.  He thinks maybe his nasal symptoms could be a contributing factor.  He tells me for years she has been experiencing poor airflow of the left nostril.  He is concerned for possible deviated septum.  He does have alternating symptoms, but more severe on the left side, which at baseline has decreased flow.  He sometimes uses saline spray, but has not been using steroid spray.  Sometimes worse symptoms with his allergies.    Patient still is experiencing fatigue, this has been ongoing for about 3 years.  He has general fatigue and brain fog.  He has gained some weight, but overall weight has been stable.  No fever or night sweats.  He also reports decreased libido, sometimes difficulty obtaining and maintaining erection, but most of the time he does not have issues with erections.  He has not noticed so much nightly or morning erections recently.  He thinks this might be also due to stressors.    Patient also reports having frequent cold sores, typically on the right side.  He has 1 now that is resolving.  He has not been on medications for that and is asking about medication for as needed use.  He typically has  "outbreaks every 2 months, usually if he is feeling sick around.    He goes to the dentist twice yearly.  He walks a lot, but does not do any dedicated exercise.  No weight lifting or strength exercises.    The following portions of the patient's history were reviewed and updated as appropriate: allergies, current medications, past family history, past medical history, past social history, past surgical history, and problem list.    Objective  /64 (BP Location: Left arm, Patient Position: Sitting, Cuff Size: Adult)   Pulse 72   Temp 98 °F (36.7 °C) (Temporal)   Ht 188 cm (74\")   Wt 82.7 kg (182 lb 6.4 oz)   BMI 23.42 kg/m²     Physical Exam  Vitals reviewed.   Constitutional:       Appearance: Normal appearance.   HENT:      Head: Normocephalic and atraumatic.      Right Ear: Tympanic membrane, ear canal and external ear normal. There is no impacted cerumen.      Left Ear: Tympanic membrane, ear canal and external ear normal. There is no impacted cerumen.      Nose: Nose normal. No congestion.      Mouth/Throat:      Mouth: Mucous membranes are moist.      Pharynx: Oropharynx is clear.      Comments: Anterior rhinoscopy does not reveal any obstruction.  Nose is fairly narrow  Eyes:      Extraocular Movements: Extraocular movements intact.      Conjunctiva/sclera: Conjunctivae normal.   Cardiovascular:      Rate and Rhythm: Normal rate and regular rhythm.      Heart sounds: Normal heart sounds. No murmur heard.  Pulmonary:      Effort: Pulmonary effort is normal.      Breath sounds: Normal breath sounds.   Abdominal:      General: There is no distension.      Palpations: Abdomen is soft. There is no mass.      Tenderness: There is no abdominal tenderness.   Musculoskeletal:      Cervical back: Neck supple. No tenderness.      Right lower leg: No edema.      Left lower leg: No edema.   Lymphadenopathy:      Cervical: No cervical adenopathy.   Skin:     General: Skin is warm and dry.   Neurological:      " Mental Status: He is alert and oriented to person, place, and time. Mental status is at baseline.   Psychiatric:         Behavior: Behavior normal.         Thought Content: Thought content normal.         Assessment/Plan   1. Well adult exam  Counseled on recommendations for annual flu shot, COVID-vaccine and tetanus/Tdap vaccine every 10 years, next due in 2029.  Counseled recommendations for moderate intensity exercise 2.5 hours weekly.  Counseled on recommendations for regular dental visits, patient goes twice yearly.    2. Nasal obstruction  3. Seasonal allergic rhinitis due to pollen  Patient has previously seen Dr. Jones for nosebleed.  He might consider trial of over-the-counter nasal steroid spray like Flonase.  Will refer to ENT for evaluation due to longstanding left nasal stenosis.  - Ambulatory Referral to ENT (Otolaryngology)    4. Decreased libido  5. Erectile dysfunction, unspecified erectile dysfunction type  Will do blood work as ordered.  Consider further workup if indicated.  May be component of stress and anxiety contributing.  Offered follow-up appointment, for now he wants to monitor symptoms and get back in touch as needed, or if any concerning findings on blood work.  - Testosterone; Future  - Prolactin; Future  - TSH Rfx On Abnormal To Free T4; Future    6. Recurrent cold sores  Will check antibodies.  He reports no history of genital sores or outbreaks.  - HSV 1 & 2 - Specific Antibody, IgG; Future  - valACYclovir (Valtrex) 500 MG tablet; Take 1 tablet by mouth 2 (Two) Times a Day. For 3 d with recurrence of cold sore.  Dispense: 6 tablet; Refill: 3    7. Fatigue, unspecified type  May be chronic fatigue/postinfectious fatigue.  Previously did workup.  Will recheck blood work.  - Vitamin B12; Future  - Comprehensive Metabolic Panel; Future  - CBC (No Diff); Future    8. MILANA (generalized anxiety disorder)  Reports doing fairly well off medications.  Will continue to monitor.  Consider  evaluation if symptoms recur    9. VARINDER (obstructive sleep apnea)  Did not tolerate CPAP well.  Continue follow-up with sleep medicine.    10. Lipid screening  Patient is fasting for lipids today  - Lipid Panel; Future      BMI is within normal parameters. No other follow-up for BMI required.      Return in about 1 year (around 8/5/2025), or if symptoms worsen or fail to improve, for Annual physical.    Future Appointments         Provider Department Center    1/15/2025 11:00 AM Megha Hsieh APRN [facility] SLEEP MEDICINE [facility]    8/11/2025 8:00 AM Jean Claude Bennett MD [facility] INTERNAL MEDICINE [facility]              Jean Claude Bennett MD  Family Medicine  08/05/2024

## 2024-08-06 LAB
HSV1 IGG SER IA-ACNC: <0.91 INDEX (ref 0–0.9)
HSV2 IGG SER IA-ACNC: <0.91 INDEX (ref 0–0.9)

## 2025-08-11 ENCOUNTER — OFFICE VISIT (OUTPATIENT)
Dept: INTERNAL MEDICINE | Facility: CLINIC | Age: 25
End: 2025-08-11
Payer: COMMERCIAL

## 2025-08-11 VITALS
HEIGHT: 73 IN | WEIGHT: 179.8 LBS | BODY MASS INDEX: 23.83 KG/M2 | TEMPERATURE: 97.7 F | DIASTOLIC BLOOD PRESSURE: 78 MMHG | SYSTOLIC BLOOD PRESSURE: 120 MMHG | HEART RATE: 68 BPM

## 2025-08-11 DIAGNOSIS — Z00.00 WELL ADULT EXAM: Primary | ICD-10-CM

## 2025-08-11 DIAGNOSIS — H93.8X2 IRRITATION OF LEFT EAR: ICD-10-CM

## 2025-08-11 PROCEDURE — 99395 PREV VISIT EST AGE 18-39: CPT | Performed by: STUDENT IN AN ORGANIZED HEALTH CARE EDUCATION/TRAINING PROGRAM
